# Patient Record
Sex: MALE | Race: WHITE | NOT HISPANIC OR LATINO | Employment: FULL TIME | URBAN - METROPOLITAN AREA
[De-identification: names, ages, dates, MRNs, and addresses within clinical notes are randomized per-mention and may not be internally consistent; named-entity substitution may affect disease eponyms.]

---

## 2018-06-01 ENCOUNTER — HOSPITAL ENCOUNTER (EMERGENCY)
Facility: CLINIC | Age: 43
Discharge: HOME | End: 2018-06-01
Attending: EMERGENCY MEDICINE | Admitting: EMERGENCY MEDICINE
Payer: COMMERCIAL

## 2018-06-01 VITALS
SYSTOLIC BLOOD PRESSURE: 108 MMHG | DIASTOLIC BLOOD PRESSURE: 77 MMHG | RESPIRATION RATE: 16 BRPM | HEIGHT: 72 IN | WEIGHT: 250 LBS | BODY MASS INDEX: 33.86 KG/M2 | TEMPERATURE: 98 F | OXYGEN SATURATION: 97 %

## 2018-06-01 DIAGNOSIS — G56.22 LESION OF LEFT ULNAR NERVE: ICD-10-CM

## 2018-06-01 DIAGNOSIS — R07.89 ATYPICAL CHEST PAIN: ICD-10-CM

## 2018-06-01 DIAGNOSIS — K21.9 GASTROESOPHAGEAL REFLUX DISEASE, ESOPHAGITIS PRESENCE NOT SPECIFIED: ICD-10-CM

## 2018-06-01 LAB
ANION GAP SERPL CALCULATED.3IONS-SCNC: 7 MMOL/L (ref 3–14)
BASOPHILS # BLD AUTO: 0 10E9/L (ref 0–0.2)
BASOPHILS NFR BLD AUTO: 0.3 %
BUN SERPL-MCNC: 12 MG/DL (ref 7–30)
CALCIUM SERPL-MCNC: 8.1 MG/DL (ref 8.5–10.1)
CHLORIDE SERPL-SCNC: 110 MMOL/L (ref 94–109)
CO2 SERPL-SCNC: 22 MMOL/L (ref 20–32)
CREAT SERPL-MCNC: 0.95 MG/DL (ref 0.66–1.25)
DIFFERENTIAL METHOD BLD: ABNORMAL
EOSINOPHIL # BLD AUTO: 0.1 10E9/L (ref 0–0.7)
EOSINOPHIL NFR BLD AUTO: 0.6 %
ERYTHROCYTE [DISTWIDTH] IN BLOOD BY AUTOMATED COUNT: 12.3 % (ref 10–15)
GFR SERPL CREATININE-BSD FRML MDRD: 88 ML/MIN/1.7M2
GLUCOSE SERPL-MCNC: 98 MG/DL (ref 70–99)
HCT VFR BLD AUTO: 46.1 % (ref 40–53)
HGB BLD-MCNC: 16.5 G/DL (ref 13.3–17.7)
IMM GRANULOCYTES # BLD: 0 10E9/L (ref 0–0.4)
IMM GRANULOCYTES NFR BLD: 0.1 %
INTERPRETATION ECG - MUSE: NORMAL
LYMPHOCYTES # BLD AUTO: 1.9 10E9/L (ref 0.8–5.3)
LYMPHOCYTES NFR BLD AUTO: 19 %
MCH RBC QN AUTO: 31.9 PG (ref 26.5–33)
MCHC RBC AUTO-ENTMCNC: 35.8 G/DL (ref 31.5–36.5)
MCV RBC AUTO: 89 FL (ref 78–100)
MONOCYTES # BLD AUTO: 0.3 10E9/L (ref 0–1.3)
MONOCYTES NFR BLD AUTO: 3.3 %
NEUTROPHILS # BLD AUTO: 7.5 10E9/L (ref 1.6–8.3)
NEUTROPHILS NFR BLD AUTO: 76.7 %
NRBC # BLD AUTO: 0 10*3/UL
NRBC BLD AUTO-RTO: 0 /100
PLATELET # BLD AUTO: 119 10E9/L (ref 150–450)
POTASSIUM SERPL-SCNC: 4.2 MMOL/L (ref 3.4–5.3)
RBC # BLD AUTO: 5.17 10E12/L (ref 4.4–5.9)
SODIUM SERPL-SCNC: 139 MMOL/L (ref 133–144)
TROPONIN I SERPL-MCNC: <0.015 UG/L (ref 0–0.04)
WBC # BLD AUTO: 9.8 10E9/L (ref 4–11)

## 2018-06-01 PROCEDURE — 25000125 ZZHC RX 250: Performed by: EMERGENCY MEDICINE

## 2018-06-01 PROCEDURE — 25000132 ZZH RX MED GY IP 250 OP 250 PS 637: Performed by: EMERGENCY MEDICINE

## 2018-06-01 PROCEDURE — 99284 EMERGENCY DEPT VISIT MOD MDM: CPT

## 2018-06-01 PROCEDURE — 84484 ASSAY OF TROPONIN QUANT: CPT | Performed by: EMERGENCY MEDICINE

## 2018-06-01 PROCEDURE — 85025 COMPLETE CBC W/AUTO DIFF WBC: CPT | Performed by: EMERGENCY MEDICINE

## 2018-06-01 PROCEDURE — 93005 ELECTROCARDIOGRAM TRACING: CPT

## 2018-06-01 PROCEDURE — 80048 BASIC METABOLIC PNL TOTAL CA: CPT | Performed by: EMERGENCY MEDICINE

## 2018-06-01 RX ORDER — OMEPRAZOLE 40 MG/1
40 CAPSULE, DELAYED RELEASE ORAL DAILY
Qty: 30 CAPSULE | Refills: 1 | Status: SHIPPED | OUTPATIENT
Start: 2018-06-01 | End: 2021-07-29

## 2018-06-01 RX ADMIN — LIDOCAINE HYDROCHLORIDE 30 ML: 20 SOLUTION ORAL; TOPICAL at 12:43
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
## 2018-06-01 NOTE — ED AVS SNAPSHOT
Emergency Department    [address] [facility] [address]    Phone:  [phone]    Fax:  [phone]                                       Cristofer Del Cid   MRN: [id number]    Department:   Emergency Department   Date of Visit:  6/1/2018           Patient Information     Date Of Birth          1975        Your diagnoses for this visit were:     Lesion of left ulnar nerve     Atypical chest pain     Gastroesophageal reflux disease, esophagitis presence not specified        You were seen by Nam Huitron MD.      Follow-up Information     Follow up with your primary MD.        Discharge Instructions         GERD (Adult)    The esophagus is a tube that carries food from the mouth to the stomach. A valve at the lower end of the esophagus prevents stomach acid from flowing upward. When this valve doesn't work properly, stomach contents may repeatedly flow back up (reflux) into the esophagus. This is called gastroesophageal reflux disease (GERD). GERD can irritate the esophagus. It can cause problems with swallowing or breathing. In severe cases, GERD can cause recurrent pneumonia or other serious problems.  Symptoms of reflux include burning, pressure or sharp pain in the upper abdomen or mid to lower chest. The pain can spread to the neck, back, or shoulder. There may be belching, an acid taste in the back of the throat, chronic cough, or sore throat or hoarseness. GERD symptoms often occur during the day after a big meal. They can also occur at night when lying down.   Home care  Lifestyle changes can help reduce symptoms. If needed, medicines may be prescribed. Symptoms often improve with treatment, but if treatment is stopped, the symptoms often return after a few months. So most persons with GERD will need to continue treatment.  Lifestyle changes    Limit or avoid fatty, fried, and spicy foods, as well as coffee, chocolate, mint, and foods with high acid content such as tomatoes and citrus  "fruit and juices (orange, grapefruit, lemon).    Don t eat large meals, especially at night. Frequent, smaller meals are best. Do not lie down right after eating. And don t eat anything 3 hours before going to bed.    Avoid drinking alcohol and smoking. As much as possible, stay away from second hand smoke.    If you are overweight, losing weight will reduce symptoms.     Avoid wearing tight clothing around your stomach area.    If your symptoms occur during sleep, use a foam wedge to elevate your upper body (not just your head.) Or, place 4\" blocks under the head of your bed.  Medicines  If needed, medicines can help relieve the symptoms of GERD and prevent damage to the esophagus. Discuss a medicine plan with your healthcare provider. This may include one or more of the following medicines:    Antacids to help neutralize the normal acids in your stomach.    Acid blockers (H2 blockers) to decrease acid production.    Acid inhibitors (PPIs) to decrease acid production in a different way than the blockers. They may work better, but can take a little longer to take effect.  Take an antacid 30-60 minutes after eating and at bedtime, but not at the same time as an acid blocker.  Try not to take medicines such as ibuprofen and aspirin. If you are taking aspirin for your heart or other medical reasons, talk to your healthcare provider about stopping it.  Follow-up care  Follow up with your healthcare provider or as advised by our staff.  When to seek medical advice  Call your healthcare provider if any of the following occur:    Stomach pain gets worse or moves to the lower right abdomen (appendix area)    Chest pain appears or gets worse, or spreads to the back, neck, shoulder, or arm    Frequent vomiting (can t keep down liquids)    Blood in the stool or vomit (red or black in color)    Feeling weak or dizzy    Fever of 100.4 F (38 C) or higher, or as directed by your healthcare provider  Date Last Reviewed: " 6/23/2015 2000-2017 [employer]. [address] [place] [address], [place] [address]. All rights reserved. This information is not intended as a substitute for professional medical care. Always follow your healthcare professional's instructions.      Discharge Instructions  Chest Pain    You have been seen today for chest pain or discomfort.  At this time, your doctor has found no signs that your chest pain is due to a serious or life-threatening condition, (or you have declined more testing and/or admission to the hospital). However, sometimes there is a serious problem that does not show up right away. Your evaluation today may not be complete and you may need further testing and evaluation.     You need to follow-up with your regular doctor within 3 days.    Return to the Emergency Department if:    Your chest pain changes, gets worse, starts to happen more often, or comes with less activity.    You are short of breath.    You get very weak or tired.    You pass out or faint.    You have any new symptoms, like fever, cough, numb legs, or you cough up blood.    You have anything else that worries you.    Until you follow-up with your regular doctor please do the following:    Take one aspirin daily unless you have an allergy or are told not to by your doctor.    If a stress test appointment has been made, go to the appointment.    If you have questions, contact your regular doctor.    If your doctor today has told you to follow-up with your regular doctor, it is very important that you make an appointment with your clinic and go to the appointment.  If you do not follow-up with your primary doctor, it may result in missing an important development which could result in permanent injury or disability and/or lasting pain.  If there is any problem keeping your appointment, call your doctor or return to the Emergency Department.    If you were given a prescription for medicine here today, be sure to read all of  the information (including the package insert) that comes with your prescription.  This will include important information about the medicine, its side effects, and any warnings that you need to know about.  The pharmacist who fills the prescription can provide more information and answer questions you may have about the medicine.  If you have questions or concerns that the pharmacist cannot address, please call or return to the Emergency Department.     Opioid Medication Information    Pain medications are among the most commonly prescribed medicines, so we are including this information for all our patients. If you did not receive pain medication or get a prescription for pain medicine, you can ignore it.     You may have been given a prescription for an opioid (narcotic) pain medicine and/or have received a pain medicine while here in the Emergency Department. These medicines can make you drowsy or impaired. You must not drive, operate dangerous equipment, or engage in any other dangerous activities while taking these medications. If you drive while taking these medications, you could be arrested for DUI, or driving under the influence. Do not drink any alcohol while you are taking these medications.     Opioid pain medications can cause addiction. If you have a history of chemical dependency of any type, you are at a higher risk of becoming addicted to pain medications.  Only take these prescribed medications to treat your pain when all other options have been tried. Take it for as short a time and as few doses as possible. Store your pain pills in a secure place, as they are frequently stolen and provide a dangerous opportunity for children or visitors in your house to start abusing these powerful medications. We will not replace any lost or stolen medicine.  As soon as your pain is better, you should flush all your remaining medication.     Many prescription pain medications contain Tylenol  (acetaminophen),  "including Vicodin , Tylenol #3 , Norco , Lortab , and Percocet .  You should not take any extra pills of Tylenol  if you are using these prescription medications or you can get very sick.  Do not ever take more than 3000 mg of acetaminophen in any 24 hour period.    All opioids tend to cause constipation. Drink plenty of water and eat foods that have a lot of fiber, such as fruits, vegetables, prune juice, apple juice and high fiber cereal.  Take a laxative if you don t move your bowels at least every other day. Miralax , Milk of Magnesia, Colace , or Senna  can be used to keep you regular.      Remember that you can always come back to the Emergency Department if you are not able to see your regular doctor in the amount of time listed above, if you get any new symptoms, or if there is anything that worries you.          Ulnar Nerve Palsy  The ulnar nerve travels down the arm from the shoulder to the hand. It controls movement and feeling in the wrist and hand. Damage to this nerve can cause a condition called ulnar nerve palsy.  A common cause of ulnar nerve palsy is leaning on the elbow for long periods of time. Injury to the arm or elbow, such as a fracture, can also damage the ulnar nerve.  Symptoms of ulnar nerve palsy include:    Numbness, tingling, or burning (often described as \"pins and needles\")    Pain    Weakness in the hand and fingers  The treatment depends on the cause of the nerve damage. In some cases, the problem will go away on its once pressure to the nerve is relieved. Splinting the wrist to limit movement may help with healing. If the problem is due to trauma or injury, physical therapy may be prescribed. Corticosteroids injections into the area may reduce swelling and pressure on the nerve. Surgery to repair the nerve may be needed for chronic symptoms that do not respond to simpler treatments.  Home care    Rest the wrist and arm until normal feeling and strength return.    If you were given " a splint or sling, wear it as directed.    Avoid positions leaning on your elbows.    If medicines were prescribed for pain or nerve sensations, take them as directed.  Follow-up care  Follow up with your healthcare provider or as advised by our staff.  When to seek medical advice  Call your healthcare provider right away if you have any of the following:    Increasing arm swelling or pain    Numbness or weakness of the arm    Symptoms spread to other parts of the body    Slurred speech, confusion    Trouble speaking, walking, or seeing  Date Last Reviewed: 9/25/2015 2000-2017 [employer]. [address] [place] [address]. All rights reserved. This information is not intended as a substitute for professional medical care. Always follow your healthcare professional's instructions.          24 Hour Appointment Hotline       To make an appointment at any [facility], call [phone] ([phone]). If you don't have a family doctor or clinic, we will help you find one. [facility] clinics are conveniently located to serve the needs of you and your family.          ED Discharge Orders     Exercise Stress Echocardiogram       Administration of IV contrast will be tailored to this examination per the appropriate written protocol listed in the Echocardiography department Protocol Book, or by the supervising Cardiologist. This may result in an order change.    Use of contrast is at the discretion of the supervising Cardiologist.                     Review of your medicines      START taking        Dose / Directions Last dose taken    omeprazole 40 MG capsule   Commonly known as:  priLOSEC   Dose:  40 mg   Quantity:  30 capsule        Take 1 capsule (40 mg) by mouth daily Take 30-60 minutes before a meal.   Refills:  1                Prescriptions were sent or printed at these locations (1 Prescription)                   Other Prescriptions                Printed at Department/Unit printer (1  of 1)         omeprazole (PRILOSEC) 40 MG capsule                Procedures and tests performed during your visit     Basic metabolic panel    CBC with platelets + differential    EKG 12 lead    Peripheral IV catheter    Troponin I (now)      Orders Needing Specimen Collection     None      Pending Results     No orders found from 5/30/2018 to 6/2/2018.            Pending Culture Results     No orders found from 5/30/2018 to 6/2/2018.            Pending Results Instructions     If you had any lab results that were not finalized at the time of your Discharge, you can call the ED Lab Result RN at [phone]. You will be contacted by this team for any positive Lab results or changes in treatment. The nurses are available 7 days a week from 10A to 6:30P.  You can leave a message 24 hours per day and they will return your call.        Test Results From Your Hospital Stay        6/1/2018 10:58 AM      Component Results     Component Value Ref Range & Units Status    WBC 9.8 4.0 - 11.0 10e9/L Final    RBC Count 5.17 4.4 - 5.9 10e12/L Final    Hemoglobin 16.5 13.3 - 17.7 g/dL Final    Hematocrit 46.1 40.0 - 53.0 % Final    MCV 89 78 - 100 fl Final    MCH 31.9 26.5 - 33.0 pg Final    MCHC 35.8 31.5 - 36.5 g/dL Final    RDW 12.3 10.0 - 15.0 % Final    Platelet Count 119 (L) 150 - 450 10e9/L Final    Diff Method Automated Method  Final    % Neutrophils 76.7 % Final    % Lymphocytes 19.0 % Final    % Monocytes 3.3 % Final    % Eosinophils 0.6 % Final    % Basophils 0.3 % Final    % Immature Granulocytes 0.1 % Final    Nucleated RBCs 0 0 /100 Final    Absolute Neutrophil 7.5 1.6 - 8.3 10e9/L Final    Absolute Lymphocytes 1.9 0.8 - 5.3 10e9/L Final    Absolute Monocytes 0.3 0.0 - 1.3 10e9/L Final    Absolute Eosinophils 0.1 0.0 - 0.7 10e9/L Final    Absolute Basophils 0.0 0.0 - 0.2 10e9/L Final    Abs Immature Granulocytes 0.0 0 - 0.4 10e9/L Final    Absolute Nucleated RBC 0.0  Final         6/1/2018 11:17 AM      Component  Results     Component Value Ref Range & Units Status    Sodium 139 133 - 144 mmol/L Final    Potassium 4.2 3.4 - 5.3 mmol/L Final    Chloride 110 (H) 94 - 109 mmol/L Final    Carbon Dioxide 22 20 - 32 mmol/L Final    Anion Gap 7 3 - 14 mmol/L Final    Glucose 98 70 - 99 mg/dL Final    Urea Nitrogen 12 7 - 30 mg/dL Final    Creatinine 0.95 0.66 - 1.25 mg/dL Final    GFR Estimate 88 >60 mL/min/1.7m2 Final    Non  GFR Calc    GFR Estimate If Black >90 >60 mL/min/1.7m2 Final    African American GFR Calc    Calcium 8.1 (L) 8.5 - 10.1 mg/dL Final         6/1/2018 11:20 AM      Component Results     Component Value Ref Range & Units Status    Troponin I ES <0.015 0.000 - 0.045 ug/L Final    The 99th percentile for upper reference range is 0.045 ug/L.  Troponin values   in the range of 0.045 - 0.120 ug/L may be associated with risks of adverse   clinical events.                  Clinical Quality Measure: Blood Pressure Screening     Your blood pressure was checked while you were in the emergency department today. The last reading we obtained was  BP: 108/77 . Please read the guidelines below about what these numbers mean and what you should do about them.  If your systolic blood pressure (the top number) is less than 120 and your diastolic blood pressure (the bottom number) is less than 80, then your blood pressure is normal. There is nothing more that you need to do about it.  If your systolic blood pressure (the top number) is 120-139 or your diastolic blood pressure (the bottom number) is 80-89, your blood pressure may be higher than it should be. You should have your blood pressure rechecked within a year by a primary care provider.  If your systolic blood pressure (the top number) is 140 or greater or your diastolic blood pressure (the bottom number) is 90 or greater, you may have high blood pressure. High blood pressure is treatable, but if left untreated over time it can put you at risk for heart  "attack, stroke, or kidney failure. You should have your blood pressure rechecked by a primary care provider within the next 4 weeks.  If your provider in the emergency department today gave you specific instructions to follow-up with your doctor or provider even sooner than that, you should follow that instruction and not wait for up to 4 weeks for your follow-up visit.        Thank you for choosing [facility]       Thank you for choosing [facility] for your care. Our goal is always to provide you with excellent care. Hearing back from our patients is one way we can continue to improve our services. Please take a few minutes to complete the written survey that you may receive in the mail after you visit with us. Thank you!        [employer] Information     [employer] lets you send messages to your doctor, view your test results, renew your prescriptions, schedule appointments and more. To sign up, go to [email or web address] . Click on \"Log in\" on the left side of the screen, which will take you to the Welcome page. Then click on \"Sign up Now\" on the right side of the page.     You will be asked to enter the access code listed below, as well as some personal information. Please follow the directions to create your username and password.     Your access code is: [email or web address]  Expires: 2018  1:13 PM     Your access code will  in 90 days. If you need help or a new code, please call your [facility] clinic or [phone].        Care EveryWhere ID     This is your Care EveryWhere ID. This could be used by other organizations to access your [facility] medical records  [id number]        Equal Access to Services     MERY DALEY : Hadmonica Atwood, waaxda luqadaha, qaybta kaalmada brad, geneva ramsey. So [facility] [phone].    ATENCIÓN: Si habla español, tiene a nathan disposición servicios gratuitos de asistencia lingüística. Llame al [phone].    We comply with applicable " federal civil rights laws and [place] laws. We do not discriminate on the basis of race, color, national origin, age, disability, sex, sexual orientation, or gender identity.            After Visit Summary       This is your record. Keep this with you and show to your community pharmacist(s) and doctor(s) at your next visit.

## 2018-06-01 NOTE — ED AVS SNAPSHOT
Emergency Department    [address] [place] [address]    Phone:  [phone]    Fax:  [phone]                                       Cristofer Del Cid   MRN: [id number]    Department:   Emergency Department   Date of Visit:  6/1/2018           After Visit Summary Signature Page     I have received my discharge instructions, and my questions have been answered. I have discussed any challenges I see with this plan with the nurse or doctor.    ..........................................................................................................................................  Patient/Patient Representative Signature      ..........................................................................................................................................  Patient Representative Print Name and Relationship to Patient    ..................................................               ................................................  Date                                            Time    ..........................................................................................................................................  Reviewed by Signature/Title    ...................................................              ..............................................  Date                                                            Time

## 2018-06-01 NOTE — DISCHARGE INSTRUCTIONS
"  GERD (Adult)    The esophagus is a tube that carries food from the mouth to the stomach. A valve at the lower end of the esophagus prevents stomach acid from flowing upward. When this valve doesn't work properly, stomach contents may repeatedly flow back up (reflux) into the esophagus. This is called gastroesophageal reflux disease (GERD). GERD can irritate the esophagus. It can cause problems with swallowing or breathing. In severe cases, GERD can cause recurrent pneumonia or other serious problems.  Symptoms of reflux include burning, pressure or sharp pain in the upper abdomen or mid to lower chest. The pain can spread to the neck, back, or shoulder. There may be belching, an acid taste in the back of the throat, chronic cough, or sore throat or hoarseness. GERD symptoms often occur during the day after a big meal. They can also occur at night when lying down.   Home care  Lifestyle changes can help reduce symptoms. If needed, medicines may be prescribed. Symptoms often improve with treatment, but if treatment is stopped, the symptoms often return after a few months. So most persons with GERD will need to continue treatment.  Lifestyle changes    Limit or avoid fatty, fried, and spicy foods, as well as coffee, chocolate, mint, and foods with high acid content such as tomatoes and citrus fruit and juices (orange, grapefruit, lemon).    Don t eat large meals, especially at night. Frequent, smaller meals are best. Do not lie down right after eating. And don t eat anything 3 hours before going to bed.    Avoid drinking alcohol and smoking. As much as possible, stay away from second hand smoke.    If you are overweight, losing weight will reduce symptoms.     Avoid wearing tight clothing around your stomach area.    If your symptoms occur during sleep, use a foam wedge to elevate your upper body (not just your head.) Or, place 4\" blocks under the head of your bed.  Medicines  If needed, medicines can help relieve " the symptoms of GERD and prevent damage to the esophagus. Discuss a medicine plan with your healthcare provider. This may include one or more of the following medicines:    Antacids to help neutralize the normal acids in your stomach.    Acid blockers (H2 blockers) to decrease acid production.    Acid inhibitors (PPIs) to decrease acid production in a different way than the blockers. They may work better, but can take a little longer to take effect.  Take an antacid 30-60 minutes after eating and at bedtime, but not at the same time as an acid blocker.  Try not to take medicines such as ibuprofen and aspirin. If you are taking aspirin for your heart or other medical reasons, talk to your healthcare provider about stopping it.  Follow-up care  Follow up with your healthcare provider or as advised by our staff.  When to seek medical advice  Call your healthcare provider if any of the following occur:    Stomach pain gets worse or moves to the lower right abdomen (appendix area)    Chest pain appears or gets worse, or spreads to the back, neck, shoulder, or arm    Frequent vomiting (can t keep down liquids)    Blood in the stool or vomit (red or black in color)    Feeling weak or dizzy    Fever of 100.4 F (38 C) or higher, or as directed by your healthcare provider  Date Last Reviewed: 6/23/2015 2000-2017 [employer]. [address] [place] [address], [place] [address]. All rights reserved. This information is not intended as a substitute for professional medical care. Always follow your healthcare professional's instructions.      Discharge Instructions  Chest Pain    You have been seen today for chest pain or discomfort.  At this time, your doctor has found no signs that your chest pain is due to a serious or life-threatening condition, (or you have declined more testing and/or admission to the hospital). However, sometimes there is a serious problem that does not show up right away. Your evaluation today may  not be complete and you may need further testing and evaluation.     You need to follow-up with your regular doctor within 3 days.    Return to the Emergency Department if:    Your chest pain changes, gets worse, starts to happen more often, or comes with less activity.    You are short of breath.    You get very weak or tired.    You pass out or faint.    You have any new symptoms, like fever, cough, numb legs, or you cough up blood.    You have anything else that worries you.    Until you follow-up with your regular doctor please do the following:    Take one aspirin daily unless you have an allergy or are told not to by your doctor.    If a stress test appointment has been made, go to the appointment.    If you have questions, contact your regular doctor.    If your doctor today has told you to follow-up with your regular doctor, it is very important that you make an appointment with your clinic and go to the appointment.  If you do not follow-up with your primary doctor, it may result in missing an important development which could result in permanent injury or disability and/or lasting pain.  If there is any problem keeping your appointment, call your doctor or return to the Emergency Department.    If you were given a prescription for medicine here today, be sure to read all of the information (including the package insert) that comes with your prescription.  This will include important information about the medicine, its side effects, and any warnings that you need to know about.  The pharmacist who fills the prescription can provide more information and answer questions you may have about the medicine.  If you have questions or concerns that the pharmacist cannot address, please call or return to the Emergency Department.     Opioid Medication Information    Pain medications are among the most commonly prescribed medicines, so we are including this information for all our patients. If you did not receive  pain medication or get a prescription for pain medicine, you can ignore it.     You may have been given a prescription for an opioid (narcotic) pain medicine and/or have received a pain medicine while here in the Emergency Department. These medicines can make you drowsy or impaired. You must not drive, operate dangerous equipment, or engage in any other dangerous activities while taking these medications. If you drive while taking these medications, you could be arrested for DUI, or driving under the influence. Do not drink any alcohol while you are taking these medications.     Opioid pain medications can cause addiction. If you have a history of chemical dependency of any type, you are at a higher risk of becoming addicted to pain medications.  Only take these prescribed medications to treat your pain when all other options have been tried. Take it for as short a time and as few doses as possible. Store your pain pills in a secure place, as they are frequently stolen and provide a dangerous opportunity for children or visitors in your house to start abusing these powerful medications. We will not replace any lost or stolen medicine.  As soon as your pain is better, you should flush all your remaining medication.     Many prescription pain medications contain Tylenol  (acetaminophen), including Vicodin , Tylenol #3 , Norco , Lortab , and Percocet .  You should not take any extra pills of Tylenol  if you are using these prescription medications or you can get very sick.  Do not ever take more than 3000 mg of acetaminophen in any 24 hour period.    All opioids tend to cause constipation. Drink plenty of water and eat foods that have a lot of fiber, such as fruits, vegetables, prune juice, apple juice and high fiber cereal.  Take a laxative if you don t move your bowels at least every other day. Miralax , Milk of Magnesia, Colace , or Senna  can be used to keep you regular.      Remember that you can always come back  "to the Emergency Department if you are not able to see your regular doctor in the amount of time listed above, if you get any new symptoms, or if there is anything that worries you.          Ulnar Nerve Palsy  The ulnar nerve travels down the arm from the shoulder to the hand. It controls movement and feeling in the wrist and hand. Damage to this nerve can cause a condition called ulnar nerve palsy.  A common cause of ulnar nerve palsy is leaning on the elbow for long periods of time. Injury to the arm or elbow, such as a fracture, can also damage the ulnar nerve.  Symptoms of ulnar nerve palsy include:    Numbness, tingling, or burning (often described as \"pins and needles\")    Pain    Weakness in the hand and fingers  The treatment depends on the cause of the nerve damage. In some cases, the problem will go away on its once pressure to the nerve is relieved. Splinting the wrist to limit movement may help with healing. If the problem is due to trauma or injury, physical therapy may be prescribed. Corticosteroids injections into the area may reduce swelling and pressure on the nerve. Surgery to repair the nerve may be needed for chronic symptoms that do not respond to simpler treatments.  Home care    Rest the wrist and arm until normal feeling and strength return.    If you were given a splint or sling, wear it as directed.    Avoid positions leaning on your elbows.    If medicines were prescribed for pain or nerve sensations, take them as directed.  Follow-up care  Follow up with your healthcare provider or as advised by our staff.  When to seek medical advice  Call your healthcare provider right away if you have any of the following:    Increasing arm swelling or pain    Numbness or weakness of the arm    Symptoms spread to other parts of the body    Slurred speech, confusion    Trouble speaking, walking, or seeing  Date Last Reviewed: 9/25/2015 2000-2017 [employer]. [address] [facility], " [place] [address]. All rights reserved. This information is not intended as a substitute for professional medical care. Always follow your healthcare professional's instructions.

## 2018-06-03 ASSESSMENT — ENCOUNTER SYMPTOMS
PSYCHIATRIC NEGATIVE: 1
UNEXPECTED WEIGHT CHANGE: 0
TREMORS: 0
FEVER: 0
CHEST TIGHTNESS: 1
NUMBNESS: 1
SPEECH DIFFICULTY: 0
HEADACHES: 0
DIZZINESS: 0
FACIAL ASYMMETRY: 0
GASTROINTESTINAL NEGATIVE: 1
EYES NEGATIVE: 1
LIGHT-HEADEDNESS: 0
WEAKNESS: 0
CHILLS: 0

## 2019-08-01 ENCOUNTER — RECORDS - HEALTHEAST (OUTPATIENT)
Dept: GENERAL RADIOLOGY | Facility: CLINIC | Age: 44
End: 2019-08-01

## 2019-08-01 ENCOUNTER — OFFICE VISIT - HEALTHEAST (OUTPATIENT)
Dept: FAMILY MEDICINE | Facility: CLINIC | Age: 44
End: 2019-08-01

## 2019-08-01 DIAGNOSIS — M25.551 PAIN IN RIGHT HIP: ICD-10-CM

## 2019-08-01 DIAGNOSIS — G89.29 OTHER CHRONIC PAIN: ICD-10-CM

## 2019-08-01 DIAGNOSIS — M25.551 CHRONIC HIP PAIN, RIGHT: ICD-10-CM

## 2019-08-01 DIAGNOSIS — G89.29 CHRONIC HIP PAIN, RIGHT: ICD-10-CM

## 2019-08-01 DIAGNOSIS — Z00.00 ENCOUNTER FOR MEDICAL EXAMINATION TO ESTABLISH CARE: ICD-10-CM

## 2019-08-01 ASSESSMENT — MIFFLIN-ST. JEOR: SCORE: 2023.44

## 2019-08-02 ENCOUNTER — COMMUNICATION - HEALTHEAST (OUTPATIENT)
Dept: FAMILY MEDICINE | Facility: CLINIC | Age: 44
End: 2019-08-02

## 2019-08-12 ENCOUNTER — COMMUNICATION - HEALTHEAST (OUTPATIENT)
Dept: SCHEDULING | Facility: CLINIC | Age: 44
End: 2019-08-12

## 2019-08-12 DIAGNOSIS — G89.29 CHRONIC HIP PAIN, RIGHT: ICD-10-CM

## 2019-08-12 DIAGNOSIS — M25.551 CHRONIC HIP PAIN, RIGHT: ICD-10-CM

## 2019-08-21 ENCOUNTER — COMMUNICATION - HEALTHEAST (OUTPATIENT)
Dept: FAMILY MEDICINE | Facility: CLINIC | Age: 44
End: 2019-08-21

## 2019-08-27 ENCOUNTER — RECORDS - HEALTHEAST (OUTPATIENT)
Dept: ADMINISTRATIVE | Facility: OTHER | Age: 44
End: 2019-08-27

## 2019-08-28 ENCOUNTER — COMMUNICATION - HEALTHEAST (OUTPATIENT)
Dept: SCHEDULING | Facility: CLINIC | Age: 44
End: 2019-08-28

## 2019-08-28 DIAGNOSIS — G89.29 CHRONIC HIP PAIN, RIGHT: ICD-10-CM

## 2019-08-28 DIAGNOSIS — M25.551 CHRONIC HIP PAIN, RIGHT: ICD-10-CM

## 2019-10-30 ENCOUNTER — OFFICE VISIT - HEALTHEAST (OUTPATIENT)
Dept: FAMILY MEDICINE | Facility: CLINIC | Age: 44
End: 2019-10-30

## 2019-10-30 DIAGNOSIS — Z02.89 ENCOUNTER FOR COMPLETION OF FORM WITH PATIENT: ICD-10-CM

## 2019-10-30 DIAGNOSIS — M25.551 CHRONIC PAIN OF RIGHT HIP: ICD-10-CM

## 2019-10-30 DIAGNOSIS — G89.29 CHRONIC PAIN OF RIGHT HIP: ICD-10-CM

## 2019-10-30 ASSESSMENT — MIFFLIN-ST. JEOR: SCORE: 2053.21

## 2020-01-30 ENCOUNTER — OFFICE VISIT - HEALTHEAST (OUTPATIENT)
Dept: FAMILY MEDICINE | Facility: CLINIC | Age: 45
End: 2020-01-30

## 2020-01-30 DIAGNOSIS — J01.00 ACUTE MAXILLARY SINUSITIS, RECURRENCE NOT SPECIFIED: ICD-10-CM

## 2020-03-10 ENCOUNTER — OFFICE VISIT - HEALTHEAST (OUTPATIENT)
Dept: FAMILY MEDICINE | Facility: CLINIC | Age: 45
End: 2020-03-10

## 2020-03-10 DIAGNOSIS — N52.9 ERECTILE DYSFUNCTION, UNSPECIFIED ERECTILE DYSFUNCTION TYPE: ICD-10-CM

## 2020-03-10 DIAGNOSIS — M79.671 PAIN IN BOTH FEET: ICD-10-CM

## 2020-03-10 DIAGNOSIS — M79.672 PAIN IN BOTH FEET: ICD-10-CM

## 2020-03-10 ASSESSMENT — MIFFLIN-ST. JEOR: SCORE: 2087.51

## 2020-04-28 ENCOUNTER — OFFICE VISIT - HEALTHEAST (OUTPATIENT)
Dept: FAMILY MEDICINE | Facility: CLINIC | Age: 45
End: 2020-04-28

## 2020-04-28 DIAGNOSIS — N52.9 ERECTILE DYSFUNCTION, UNSPECIFIED ERECTILE DYSFUNCTION TYPE: ICD-10-CM

## 2020-04-28 DIAGNOSIS — K21.9 GASTROESOPHAGEAL REFLUX DISEASE WITHOUT ESOPHAGITIS: ICD-10-CM

## 2020-04-28 NOTE — ASSESSMENT & PLAN NOTE
The pathophysiology of reflux is discussed.  Anti-reflux measures such as raising the head of the bed, avoiding tight clothing or belts, avoiding eating late at night and not lying down shortly after mealtime and achieving weight loss are discussed. Avoid ASA, NSAID's, caffeine, peppermints, alcohol and tobacco. OTC H2 blockers and/or antacids are often very helpful for PRN use. However, for persisting chronic or daily symptoms, prescription strength H2 blockers or a trial of PPI's are often used. Further recommendations to him: Rx for PPI is written. He should alert me if there are persistent symptoms, dysphagia, weight loss or GI bleeding. FUV is scheduled.

## 2020-04-28 NOTE — ASSESSMENT & PLAN NOTE
Patient asks if he can try Cialis instead of Viagra for erectile dysfunction.  I do see Viagra listed in meds, the change has francheska made.

## 2020-05-22 ENCOUNTER — COMMUNICATION - HEALTHEAST (OUTPATIENT)
Dept: FAMILY MEDICINE | Facility: CLINIC | Age: 45
End: 2020-05-22

## 2020-05-22 DIAGNOSIS — K21.9 GASTROESOPHAGEAL REFLUX DISEASE WITHOUT ESOPHAGITIS: ICD-10-CM

## 2020-06-11 ENCOUNTER — OFFICE VISIT - HEALTHEAST (OUTPATIENT)
Dept: FAMILY MEDICINE | Facility: CLINIC | Age: 45
End: 2020-06-11

## 2020-06-11 DIAGNOSIS — H10.32 ACUTE CONJUNCTIVITIS OF LEFT EYE, UNSPECIFIED ACUTE CONJUNCTIVITIS TYPE: ICD-10-CM

## 2020-06-11 ASSESSMENT — MIFFLIN-ST. JEOR: SCORE: 2046.12

## 2020-07-06 ENCOUNTER — AMBULATORY - HEALTHEAST (OUTPATIENT)
Dept: FAMILY MEDICINE | Facility: CLINIC | Age: 45
End: 2020-07-06

## 2020-07-06 DIAGNOSIS — Z20.822 SUSPECTED COVID-19 VIRUS INFECTION: ICD-10-CM

## 2020-07-06 NOTE — PROGRESS NOTES
"Date: 2020 10:41:27  Clinician: Roberto Sims  Clinician NPI: [id number]  Patient: keesha ochoa  Patient : 1975  Patient Address: [address], [place] [address]  Patient Phone: [phone]  Visit Protocol: URI  Patient Summary:  keesha is a 45 year old ( : 1975 ) male who initiated a Visit for COVID-19 (Coronavirus) evaluation and screening. When asked the question \"Please sign me up to receive news, health information and promotions. \", keesha responded \"No\".    keesha states his symptoms started 1-2 days ago.   His symptoms consist of wheezing, nausea, malaise, and a sore throat. He is experiencing mild difficulty breathing with activities but can speak normally in full sentences.   Symptom details     Sore throat: keesha reports having mild throat pain (1-3 on a 10 point pain scale), does not have exudate on his tonsils, and can swallow liquids. He is not sure if the lymph nodes in his neck are enlarged. A rash has not appeared on the skin since the sore throat started.     Wheezing: keesha has not ever been diagnosed with asthma. Additional wheezing details as reported by the patient (free text): I was hard to breath in the heat        keesha denies having teeth pain, ageusia, diarrhea, vomiting, rhinitis, ear pain, headache, chills, myalgias, anosmia, facial pain or pressure, fever, cough, and nasal congestion. He also denies having recent facial or sinus surgery in the past 60 days and taking antibiotic medication in the past month.   Precipitating events  Within the past week, keesha has not been exposed to someone with strep throat.   Pertinent COVID-19 (Coronavirus) information  In the past 14 days, keesha has not worked in a congregate living setting.   He does not work or volunteer as healthcare worker or a  and does not work or volunteer in a healthcare facility.   keesha also has not lived in a congregate living setting in the past 14 days. He does not live " with a healthcare worker.   keesha has not had a close contact with a laboratory-confirmed COVID-19 patient within 14 days of symptom onset.   Pertinent medical history  keesha does not need a return to work/school note.   Weight: 240 lbs   keesha does not smoke or use smokeless tobacco.   Weight: 240 lbs    MEDICATIONS: No current medications, ALLERGIES: NKDA  Clinician Response:  Dear keesha,   Your symptoms show that you may have coronavirus (COVID-19). This illness can cause fever, cough and trouble breathing. Many people get a mild case and get better on their own. Some people can get very sick.  What should I do?  We would like to test you for this virus.   1. Please call [phone] to schedule your visit. Explain that you were referred by [facility] to have a COVID-19 test. Be ready to share your [facility] visit ID number.  The following will serve as your written order for this COVID Test, ordered by me, for the indication of suspected COVID [Z20.828]: The test will be ordered in [employer], our electronic health record, after you are scheduled. It will show as ordered and authorized by Chan Glover MD.  Order: COVID-19 (Coronavirus) PCR for SYMPTOMATIC testing from [facility].      2. When it's time for your COVID test:  Stay at least 6 feet away from others. (If someone will drive you to your test, stay in the backseat, as far away from the  as you can.)   Cover your mouth and nose with a mask, tissue or washcloth.  Go straight to the testing site. Don't make any stops on the way there or back.      3.Starting now: Stay home and away from others (self-isolate) until:   You've had no fever---and no medicine that reduces fever---for 3 full days (72 hours). And...   Your other symptoms have gotten better. For example, your cough or breathing has improved. And...   At least 10 days have passed since your symptoms started.       During this time, don't leave the house except for testing or medical care.   Stay in your own  "room, even for meals. Use your own bathroom if you can.   Stay away from others in your home. No hugging, kissing or shaking hands. No visitors.  Don't go to work, school or anywhere else.    Clean \"high touch\" surfaces often (doorknobs, counters, handles, etc.). Use a household cleaning spray or wipes. You'll find a full list of  on the EPA website: www.epa.gov/pesticide-registration/list-n-disinfectants-use-against-sars-cov-2.   Cover your mouth and nose with a mask, tissue or washcloth to avoid spreading germs.  Wash your hands and face often. Use soap and water.  Caregivers in these groups are at risk for severe illness due to COVID-19:  o People 65 years and older  o People who live in a nursing home or long-term care facility  o People with chronic disease (lung, heart, cancer, diabetes, kidney, liver, immunologic)  o People who have a weakened immune system, including those who:   Are in cancer treatment  Take medicine that weakens the immune system, such as corticosteroids  Had a bone marrow or organ transplant  Have an immune deficiency  Have poorly controlled HIV or AIDS  Are obese (body mass index of 40 or higher)  Smoke regularly   o Caregivers should wear gloves while washing dishes, handling laundry and cleaning bedrooms and bathrooms.  o Use caution when washing and drying laundry: Don't shake dirty laundry, and use the warmest water setting that you can.  o For more tips, go to www.cdc.gov/coronavirus/2019-ncov/downloads/10Things.pdf.    4.Sign up for [employer]. We know it's scary to hear that you might have COVID-19. We want to track your symptoms to make sure you're okay over the next 2 weeks. Please look for an email from [employer]---this is a free, online program that we'll use to keep in touch. To sign up, follow the link in the email. Learn more at [email or web address]  How can I take care of myself?   Get lots of rest. Drink extra fluids (unless a doctor has told you " not to).   Take Tylenol (acetaminophen) for fever or pain. If you have liver or kidney problems, ask your family doctor if it's okay to take Tylenol.   Adults can take either:    650 mg (two 325 mg pills) every 4 to 6 hours, or...   1,000 mg (two 500 mg pills) every 8 hours as needed.    Note: Don't take more than 3,000 mg in one day. Acetaminophen is found in many medicines (both prescribed and over-the-counter medicines). Read all labels to be sure you don't take too much.   For children, check the Tylenol bottle for the right dose. The dose is based on the child's age or weight.    If you have other health problems (like cancer, heart failure, an organ transplant or severe kidney disease): Call your specialty clinic if you don't feel better in the next 2 days.       Know when to call 911. Emergency warning signs include:    Trouble breathing or shortness of breath Pain or pressure in the chest that doesn't go away Feeling confused like you haven't felt before, or not being able to wake up Bluish-colored lips or face.  Where can I get more information?   [facility] -- About COVID-19: [email or web address]   CDC -- What to Do If You're Sick: www.cdc.gov/coronavirus/2019-ncov/about/steps-when-sick.html   CDC -- Ending Home Isolation: www.cdc.gov/coronavirus/2019-ncov/hcp/disposition-in-home-patients.html   CDC -- Caring for Someone: www.cdc.gov/coronavirus/2019-ncov/if-you-are-sick/care-for-someone.html   [facility] -- Interim Guidance for Hospital Discharge to Home: www.health.[facility].[place]/diseases/coronavirus/hcp/hospdischarge.pdf   [facility] clinical trials (COVID-19 research studies): clinicalaffairs.[facility]/umn-clinical-trials    Below are the COVID-19 hotlines at the [place] Department of Health ([facility]). Interpreters are available.    For health questions: Call 845-794-7809 or 1-109.858.3574 (7 a.m. to 7 p.m.) For questions about schools and childcare: Call 942-090-4885 or  1-456-970-0610 (7 a.m. to 7 p.m.)       [place] Strep Test    I am sorry you are not feeling well. Based on the information provided, it is possible you could have strep throat. When left untreated, strep can spread to other areas of the body and cause a more serious infection.  A strep test is the only way to determine if a bacterial infection or a virus is causing your sore throat. This is done in a lab where a swab of the back of your throat is collected and tested for the bacteria that causes strep.  Since you chose not to use the lab order, please be seen:     In a clinic or urgent care    Within 24 hours     Call 911 or go to the emergency room if you suddenly develop a rash, are drooling or unable to swallow fluids, if you are having difficulty breathing, or feel that your throat is closing off.   Diagnosis: Pain in throat  Diagnosis ICD: R07.0  [facility] Results: [place] Strep Test - Declined  [facility] Secondary Results: null

## 2020-07-07 ENCOUNTER — AMBULATORY - HEALTHEAST (OUTPATIENT)
Dept: FAMILY MEDICINE | Facility: CLINIC | Age: 45
End: 2020-07-07

## 2020-07-07 DIAGNOSIS — Z20.822 SUSPECTED COVID-19 VIRUS INFECTION: ICD-10-CM

## 2020-07-08 ENCOUNTER — COMMUNICATION - HEALTHEAST (OUTPATIENT)
Dept: FAMILY MEDICINE | Facility: CLINIC | Age: 45
End: 2020-07-08

## 2020-07-09 ENCOUNTER — NURSE TRIAGE (OUTPATIENT)
Dept: NURSING | Facility: CLINIC | Age: 45
End: 2020-07-09

## 2020-07-09 NOTE — TELEPHONE ENCOUNTER
Caller called regarding break out of his genital herpes.  States that he was not feeling okay from last Thursday (7/2)-yesterday (Wednesday-7/8).  Caller will like to talk to a provider, prefers connecting with his primary via my chart.  Virtual visit was recommended since pt wants to be seen by a provider  Yulissa Sommer RN  COVID 19 Nurse Triage Plan/Patient Instructions    Please be aware that novel coronavirus (COVID-19) may be circulating in the community. If you develop symptoms such as fever, cough, or SOB or if you have concerns about the presence of another infection including coronavirus (COVID-19), please contact your health care provider or visit [email or web address].     Disposition/Instructions    Virtual Visit with provider recommended. Reference Visit Selection Guide.    Thank you for taking steps to prevent the spread of this virus.  o Limit your contact with others.  o Wear a simple mask to cover your cough.  o Wash your hands well and often.    Resources    [facility]: About COVID-19: [email or web address]    CDC: What to Do If You're Sick: www.cdc.gov/coronavirus/2019-ncov/about/steps-when-sick.html    CDC: Ending Home Isolation: www.cdc.gov/coronavirus/2019-ncov/hcp/disposition-in-home-patients.html     CDC: Caring for Someone: www.cdc.gov/coronavirus/2019-ncov/if-you-are-sick/care-for-someone.html     [facility]: Interim Guidance for Hospital Discharge to Home: www.health.[facility].[place]/diseases/coronavirus/hcp/hospdischarge.pdf    [facility] clinical trials (COVID-19 research studies): clinicalaffairs.[facility]-clinical-trials     Below are the COVID-19 hotlines at the [facility] ([facility]). Interpreters are available.   o For health questions: Call 370-083-7319 or 1-546.327.3905 (7 a.m. to 7 p.m.)  o For questions about schools and childcare: Call 432-825-2696 or 1-584.190.1821 (7 a.m. to 7 p.m.)                     Additional Information    Herpes Simplex  (Genital), questions about    Negative: Symptoms of a Sexually Transmitted Disease or Infection (STD, STI)    Negative: HIV exposure, questions about    Negative: Needlestick, questions about    Protocols used: STD [id number]

## 2020-07-10 ENCOUNTER — OFFICE VISIT - HEALTHEAST (OUTPATIENT)
Dept: FAMILY MEDICINE | Facility: CLINIC | Age: 45
End: 2020-07-10

## 2020-07-10 DIAGNOSIS — R06.02 SHORTNESS OF BREATH: ICD-10-CM

## 2020-07-10 DIAGNOSIS — R05.9 COUGH: ICD-10-CM

## 2020-07-10 DIAGNOSIS — R11.0 NAUSEA: ICD-10-CM

## 2020-07-12 ENCOUNTER — COMMUNICATION - HEALTHEAST (OUTPATIENT)
Dept: FAMILY MEDICINE | Facility: CLINIC | Age: 45
End: 2020-07-12

## 2020-07-12 ENCOUNTER — AMBULATORY - HEALTHEAST (OUTPATIENT)
Dept: FAMILY MEDICINE | Facility: CLINIC | Age: 45
End: 2020-07-12

## 2020-07-12 DIAGNOSIS — R05.9 COUGH: ICD-10-CM

## 2020-07-12 DIAGNOSIS — R06.02 SHORTNESS OF BREATH: ICD-10-CM

## 2020-07-16 ENCOUNTER — COMMUNICATION - HEALTHEAST (OUTPATIENT)
Dept: FAMILY MEDICINE | Facility: CLINIC | Age: 45
End: 2020-07-16

## 2020-08-17 ENCOUNTER — COMMUNICATION - HEALTHEAST (OUTPATIENT)
Dept: SCHEDULING | Facility: CLINIC | Age: 45
End: 2020-08-17

## 2020-09-30 ENCOUNTER — COMMUNICATION - HEALTHEAST (OUTPATIENT)
Dept: FAMILY MEDICINE | Facility: CLINIC | Age: 45
End: 2020-09-30

## 2020-09-30 ENCOUNTER — OFFICE VISIT - HEALTHEAST (OUTPATIENT)
Dept: FAMILY MEDICINE | Facility: CLINIC | Age: 45
End: 2020-09-30

## 2020-09-30 DIAGNOSIS — Z82.5 FAMILY HISTORY OF ASTHMA: ICD-10-CM

## 2020-10-16 ENCOUNTER — COMMUNICATION - HEALTHEAST (OUTPATIENT)
Dept: FAMILY MEDICINE | Facility: CLINIC | Age: 45
End: 2020-10-16

## 2020-10-20 ENCOUNTER — OFFICE VISIT - HEALTHEAST (OUTPATIENT)
Dept: FAMILY MEDICINE | Facility: CLINIC | Age: 45
End: 2020-10-20

## 2020-10-20 DIAGNOSIS — Z02.89 ENCOUNTER FOR COMPLETION OF FORM WITH PATIENT: ICD-10-CM

## 2020-10-20 DIAGNOSIS — M25.551 CHRONIC PAIN OF RIGHT HIP: ICD-10-CM

## 2020-10-20 DIAGNOSIS — G89.29 CHRONIC PAIN OF RIGHT HIP: ICD-10-CM

## 2020-10-22 ENCOUNTER — VIRTUAL VISIT (OUTPATIENT)
Dept: FAMILY MEDICINE | Facility: OTHER | Age: 45
End: 2020-10-22

## 2020-10-23 DIAGNOSIS — Z20.822 SUSPECTED COVID-19 VIRUS INFECTION: ICD-10-CM

## 2020-10-23 DIAGNOSIS — Z20.822 SUSPECTED COVID-19 VIRUS INFECTION: Primary | ICD-10-CM

## 2020-10-23 PROCEDURE — U0003 INFECTIOUS AGENT DETECTION BY NUCLEIC ACID (DNA OR RNA); SEVERE ACUTE RESPIRATORY SYNDROME CORONAVIRUS 2 (SARS-COV-2) (CORONAVIRUS DISEASE [COVID-19]), AMPLIFIED PROBE TECHNIQUE, MAKING USE OF HIGH THROUGHPUT TECHNOLOGIES AS DESCRIBED BY CMS-2020-01-R: HCPCS | Performed by: FAMILY MEDICINE

## 2020-10-24 LAB
SARS-COV-2 RNA SPEC QL NAA+PROBE: NOT DETECTED
SPECIMEN SOURCE: NORMAL

## 2020-11-11 ENCOUNTER — VIRTUAL VISIT (OUTPATIENT)
Dept: FAMILY MEDICINE | Facility: OTHER | Age: 45
End: 2020-11-11

## 2020-11-11 NOTE — PROGRESS NOTES
"Date: 2020 16:18:15  Clinician: Miek Bustos  Clinician NPI: [id number]  Patient: keesha ochoa  Patient : 1975  Patient Address: [address] [place], [place] [address], [place] [address]  Patient Phone: [phone]  Visit Protocol: URI  Patient Summary:  keesha is a 45 year old ( : 1975 ) male who initiated a [facility] Visit for COVID-19 (Coronavirus) evaluation and screening. When asked the question \"Please sign me up to receive news, health information and promotions. \", keesha responded \"No\".    When asked when his symptoms started, keesha reported that he does not have any symptoms.   He denies taking antibiotic medication in the past month and having recent facial or sinus surgery in the past 60 days.    Pertinent COVID-19 (Coronavirus) information  keesha does not work or volunteer as healthcare worker or a . In the past 14 days, keesha has not worked or volunteered at a healthcare facility or group living setting.   In the past 14 days, he also has not lived in a congregate living setting.   keesha has had a close contact with a laboratory-confirmed COVID-19 patient in the last 14 days. He was not exposed at his work. He does not know when he was exposed to the laboratory-confirmed COVID-19 patient.   Additional information about contact with COVID-19 (Coronavirus) patient as reported by the patient (free text): Don't know. 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   keesha is not living in the same household with the COVID-19 positive patient. He was not in an enclosed space for greater than 15 minutes with the COVID-19 patient.   Since 2019, keesha has been tested for COVID-19 and has not had upper respiratory infection or influenza-like illness.      Result of COVID-19 test: Negative     Date of his COVID-19 test: 10/05/2020      Pertinent medical history  keesha does not need a return to work/school note.   Weight: 250 lbs   keesha does not smoke or use smokeless " tobacco.   Weight: 250 lbs    MEDICATIONS: No current medications, ALLERGIES: NKDA  Clinician Response:  Dear keesha,   Based on your exposure to COVID-19 (coronavirus), we would like to test you for this virus.  1. Please call [phone] to schedule your visit. Explain that you were referred by [facility] to have a COVID-19 test. Be ready to share your [facility] visit ID number.  * If you need to schedule in [facility] please call [phone] or for [employer] employees please call [phone].   * If you need to schedule in the [place] area please call [phone]. [employer] employees call [phone].   The following will serve as your written order for this COVID Test, ordered by me, for the indication of suspected COVID [Z20.828]: The test will be ordered in [employer], our electronic health record, after you are scheduled. It will show as ordered and authorized by Chan Glover MD.  Order: COVID-19 (coronavirus) PCR for ASYMPTOMATIC EXPOSURE testing from [facility].   If you know you have had close contact with someone who tested positive, you should be quarantined for 14 days after this exposure. You should stay in quarantine for the14 days even if the covid test is negative, the optimal time to test after exposure is 5-7 days from the exposure  Quarantine means   What should I do?  For safety, it's very important to follow these rules. Do this for 14 days after the date you were last exposed to the virus..  Stay home and away from others. Don't go to school or anywhere else. Generally quarantine means staying home from work but there are some exceptions to this. Please contact your workplace.   No hugging, kissing or shaking hands.  Don't let anyone visit.  Cover your mouth and nose with a mask, tissue or washcloth to avoid spreading germs.  Wash your hands and face often. Use soap and water.  What are the symptoms of COVID-19?  The most common symptoms are cough, fever and trouble breathing. Less common symptoms include  headache, body aches, fatigue (feeling very tired), chills, sore throat, stuffy or runny nose, diarrhea (loose poop), loss of taste or smell, belly pain, and nausea or vomiting (feeling sick to your stomach or throwing up).  After 14 days, if you have still don't have symptoms, you likely don't have this virus.  If you develop symptoms, follow these guidelines.  If you're normally healthy: Please start another [facility] visit to report your symptoms. Go to [email or web address].  If you have a serious health problem (like cancer, heart failure, an organ transplant or kidney disease): Call your specialty clinic. Let them know that you might have COVID-19.  2. When it's time for your COVID test:  Stay at least 6 feet away from others. (If someone will drive you to your test, stay in the backseat, as far away from the  as you can.)  Cover your mouth and nose with a mask, tissue or washcloth.  Go straight to the testing site. Don't make any stops on the way there or back.  Please note  Caregivers in these groups are at risk for severe illness due to COVID-19:  o People 65 years and older  o People who live in a nursing home or long-term care facility  o People with chronic disease (lung, heart, cancer, diabetes, kidney, liver, immunologic)  o People who have a weakened immune system, including those who:  Are in cancer treatment  Take medicine that weakens the immune system, such as corticosteroids  Had a bone marrow or organ transplant  Have an immune deficiency  Have poorly controlled HIV or AIDS  Are obese (body mass index of 40 or higher)  Smoke regularly  Where can I get more information?   [employer] -- About COVID-19: [email or web address]  CDC -- What to Do If You're Sick: www.cdc.gov/coronavirus/2019-ncov/about/steps-when-sick.html  CDC -- Ending Home Isolation: www.cdc.gov/coronavirus/2019-ncov/hcp/disposition-in-home-patients.html  CDC -- Caring for Someone:  www.cdc.gov/coronavirus/2019-ncov/if-you-are-sick/care-for-someone.html  [facility] -- Interim Guidance for Hospital Discharge to Home: www.health.[facility].[place]/diseases/coronavirus/hcp/hospdischarge.pdf  [facility] clinical trials (COVID-19 research studies): clinicalaffairs.[facility]-clinical-trials  Below are the COVID-19 hotlines at the [place] Department of Health ([facility]). Interpreters are available.  For health questions: Call 499-695-9378 or 1-412.812.8241 (7 a.m. to 7 p.m.)  For questions about schools and childcare: Call 904-981-2288 or 1-890.326.2161 (7 a.m. to 7 p.m.)    Diagnosis: Contact with and (suspected) exposure to other viral communicable diseases  Diagnosis ICD: Z20.828

## 2020-11-22 ENCOUNTER — HEALTH MAINTENANCE LETTER (OUTPATIENT)
Age: 45
End: 2020-11-22

## 2021-04-01 ENCOUNTER — OFFICE VISIT - HEALTHEAST (OUTPATIENT)
Dept: FAMILY MEDICINE | Facility: CLINIC | Age: 46
End: 2021-04-01

## 2021-04-01 DIAGNOSIS — L98.9 SORE ON LEG: ICD-10-CM

## 2021-04-01 DIAGNOSIS — D22.9 MULTIPLE ATYPICAL SKIN MOLES: ICD-10-CM

## 2021-04-01 DIAGNOSIS — R63.4 WEIGHT LOSS: ICD-10-CM

## 2021-04-01 DIAGNOSIS — Z13.1 SCREENING FOR DIABETES MELLITUS: ICD-10-CM

## 2021-04-01 DIAGNOSIS — Z13.220 LIPID SCREENING: ICD-10-CM

## 2021-04-01 LAB
ALBUMIN SERPL-MCNC: 4.2 G/DL (ref 3.5–5)
ALP SERPL-CCNC: 86 U/L (ref 45–120)
ALT SERPL W P-5'-P-CCNC: 26 U/L (ref 0–45)
ANION GAP SERPL CALCULATED.3IONS-SCNC: 5 MMOL/L (ref 5–18)
AST SERPL W P-5'-P-CCNC: 29 U/L (ref 0–40)
BILIRUB SERPL-MCNC: 0.8 MG/DL (ref 0–1)
BUN SERPL-MCNC: 11 MG/DL (ref 8–22)
CALCIUM SERPL-MCNC: 8.8 MG/DL (ref 8.5–10.5)
CHLORIDE BLD-SCNC: 109 MMOL/L (ref 98–107)
CHOLEST SERPL-MCNC: 158 MG/DL
CO2 SERPL-SCNC: 25 MMOL/L (ref 22–31)
CREAT SERPL-MCNC: 0.94 MG/DL (ref 0.7–1.3)
ERYTHROCYTE [DISTWIDTH] IN BLOOD BY AUTOMATED COUNT: 11.9 % (ref 11–14.5)
FASTING STATUS PATIENT QL REPORTED: ABNORMAL
GFR SERPL CREATININE-BSD FRML MDRD: >60 ML/MIN/1.73M2
GLUCOSE BLD-MCNC: 96 MG/DL (ref 70–125)
HCT VFR BLD AUTO: 49.8 % (ref 40–54)
HDLC SERPL-MCNC: 39 MG/DL
HGB BLD-MCNC: 17.5 G/DL (ref 14–18)
LDLC SERPL CALC-MCNC: 96 MG/DL
MCH RBC QN AUTO: 31.2 PG (ref 27–34)
MCHC RBC AUTO-ENTMCNC: 35.1 G/DL (ref 32–36)
MCV RBC AUTO: 89 FL (ref 80–100)
PLATELET # BLD AUTO: 148 THOU/UL (ref 140–440)
PMV BLD AUTO: 8.9 FL (ref 7–10)
POTASSIUM BLD-SCNC: 4.3 MMOL/L (ref 3.5–5)
PROT SERPL-MCNC: 6.8 G/DL (ref 6–8)
RBC # BLD AUTO: 5.61 MILL/UL (ref 4.4–6.2)
SODIUM SERPL-SCNC: 139 MMOL/L (ref 136–145)
TRIGL SERPL-MCNC: 116 MG/DL
TSH SERPL DL<=0.005 MIU/L-ACNC: 1 UIU/ML (ref 0.3–5)
WBC: 9.3 THOU/UL (ref 4–11)

## 2021-04-16 ENCOUNTER — RECORDS - HEALTHEAST (OUTPATIENT)
Dept: ADMINISTRATIVE | Facility: OTHER | Age: 46
End: 2021-04-16

## 2021-05-31 NOTE — TELEPHONE ENCOUNTER
Name of form/paperwork: [employer]  Have you been seen for this request: Yes:  8/1/19  Do we have the form: Yes- patient will fax to [phone] when he returns to work on Friday  When is form needed by: As able  How would you like the form returned: Patient is not sure exactly where the form needs to be returned. Please return to [place]- [employer].  Fax Number: On form  Patient Notified form requests are processed in 3-5 business days: Yes  (If patient needs form sooner, please note that in this message.)  Okay to leave a detailed message? Yes

## 2021-05-31 NOTE — TELEPHONE ENCOUNTER
Controlled Substance Refill Request    Medication: traMADol (ULTRAM) 50 mg tablet  Requested Prescriptions     Pending Prescriptions Disp Refills     traMADol (ULTRAM) 50 mg tablet 14 tablet 0     Sig: Can take 1-2 tablets daily.     Date Last Fill: 8/1/2019  Pharmacy:[id number] - [place] [address] AT [place] [address] & [facility]    Submit electronically to pharmacy  Controlled Substance Agreement on File:   Encounter-Level CSA Scan Date:    There are no encounter-level csa scan date.       Last office visit: Visit date 08/01/2019.

## 2021-05-31 NOTE — TELEPHONE ENCOUNTER
----- Message from AGUSTO Frey sent at 8/2/2019  8:03 AM CDT -----  No acute changes as expected. Will continue with plan for orthopedic follow up.     please help set up.

## 2021-05-31 NOTE — TELEPHONE ENCOUNTER
Left message to call back for: Results  Information to relay to patient:  Unable to leave message, phone line was busy.  Please relay message below to patient.

## 2021-05-31 NOTE — TELEPHONE ENCOUNTER
Called and informed patient that we have not received [facility] paperwork and supplied our fax number again.

## 2021-05-31 NOTE — TELEPHONE ENCOUNTER
Controlled Substance Refill Request  Medication:   Requested Prescriptions     Pending Prescriptions Disp Refills     traMADol (ULTRAM) 50 mg tablet [Pharmacy Med Name: TRAMADOL 50MG TABLETS] 14 tablet 0     Sig: TAKE 1 TO 2 TABLETS BY MOUTH DAILY     Date Last Fill: 8/13/19  Pharmacy: [id number]   Submit electronically to pharmacy  Controlled Substance Agreement on File:   Encounter-Level CSA Scan Date:    There are no encounter-level csa scan date.       Last office visit: Last office visit pertaining to requested medication was 8/1/19.

## 2021-05-31 NOTE — PROGRESS NOTES
ASSESSMENT:  1. Chronic hip pain, right  XR Pelvis W 2 Vw Hip Right    Ambulatory referral to Orthopedics    traMADol (ULTRAM) 50 mg tablet   2. Encounter for medical examination to establish care         PLAN:  Here today to establish care and discuss chronic hip pain.  Previous SI fusion, has had worsening pain over time since then especially in the last few months when he has had to work a different job which has seemed to aggravate his symptoms.  Discussed options, x-ray completed today and negative for fracture or any acute issue.  He does have hardware and so we have proceeded to refer him back to orthopedics for further evaluation and management plan.  Given ongoing severe pain will give him tramadol to use as needed daily, hopefully for short period of time.  If needing longer use would have him return for discussion of controlled substance agreement.    Otherwise healthy male, could return for a full physical once his hip pain is under better control.  No problem-specific Assessment & Plan notes found for this encounter.      There are no Patient Instructions on file for this visit.    Orders Placed This Encounter   Procedures     XR Pelvis W 2 Vw Hip Right     Standing Status:   Future     Number of Occurrences:   1     Standing Expiration Date:   8/1/2020     Order Specific Question:   Can the procedure be changed per Radiologist protocol?     Answer:   Yes     Ambulatory referral to Orthopedics     Referral Priority:   Routine     Referral Type:   Consultation     Referral Reason:   Evaluation and Treatment     Requested Specialty:   Orthopedics     Number of Visits Requested:   1     There are no discontinued medications.    No follow-ups on file.    CHIEF COMPLAINT:  Chief Complaint   Patient presents with     Hip Pain     right hip pain x 1.5 months-pt had hip surgery 3 years ago - New pt       HISTORY OF PRESENT ILLNESS:  Cristofer is a 44 y.o. male here today to establish care.  Patient was at a  "different clinic previously but due to insurance change has started up with us.  He did not do silly had a primary care provider previously.  Patient does have a history of SI joint fusion with hardware about 3 years ago.  He has had ongoing pain for some time and eventually was decided to proceed with surgery.  Since then he has had some pain but it was manageable.  In the last 1-1/2 months it has worsened with a job change.  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  He is concerned that things are worsening again or something is wrong with his previous surgery.  Either way the level of pain he is experiencing he cannot continue with and would like further evaluation and management regarding the low back pain.  Back pain is more in the right side, at this time he does not have sciatica but focused pain in the SI area on the right side.  No specific injury but pain started to worsen when he changed his work patterns.  Essentially otherwise healthy male, no other chronic medical conditions.    REVIEW OF SYSTEMS:   Pertinent items are noted in HPI.  All other systems are negative    PFSH:  Reviewed, no changes    TOBACCO USE:  Social History     Tobacco Use   Smoking Status Former Smoker   Smokeless Tobacco Former User     Types: Chew   Tobacco Comment    quit 10 yrs ago       VITALS:  Vitals:    08/01/19 1002   BP: 125/81   Patient Site: Left Arm   Patient Position: Sitting   Cuff Size: Adult Regular   Pulse: 70   Resp: 16   Temp: 97.5  F (36.4  C)   TempSrc: Oral   Weight: (!) 245 lb (111.1 kg)   Height: 5' 11\" (1.803 m)     Wt Readings from Last 3 Encounters:   08/01/19 (!) 245 lb (111.1 kg)       PHYSICAL EXAM:   /81 (Patient Site: Left Arm, Patient Position: Sitting, Cuff Size: Adult Regular)   Pulse 70   " "Temp 97.5  F (36.4  C) (Oral)   Resp 16   Ht 5' 11\" (1.803 m)   Wt (!) 245 lb (111.1 kg)   BMI 34.17 kg/m    General appearance: alert, appears stated age and cooperative  Neck: no adenopathy, no carotid bruit, no JVD, supple, symmetrical, trachea midline and thyroid not enlarged, symmetric, no tenderness/mass/nodules  Back: symmetric, no curvature. ROM normal. No CVA tenderness.  Right SI joint tenderness to palpation  Lungs: clear to auscultation bilaterally  Heart: regular rate and rhythm, S1, S2 normal, no murmur, click, rub or gallop  Extremities: extremities normal, atraumatic, no cyanosis or edema  Pulses: 2+ and symmetric  Neurologic: Grossly normal    DATA REVIEWED:  Additional History from Old Records Summarized (2): previous orthopedic and [facility] med records  Decision to Obtain Records (1): 0  Radiology Tests Summarized or Ordered (1): xray ordered and previous reviewed.   Labs Reviewed or Ordered (1): 0  Medicine Test Summarized or Ordered (1): 0  Independent Review of EKG or X-RAY(2 each): 0    The visit lasted a total of 25 minutes face to face with the patient. Over 50% of the time was spent counseling and educating the patient about plan of care.    MEDICATIONS:  Current Outpatient Medications   Medication Sig Dispense Refill     traMADol (ULTRAM) 50 mg tablet Can take 1-2 tablets daily. 14 tablet 0     No current facility-administered medications for this visit.        This note has been dictated using voice recognition software. Any grammatical or context distortions are unintentional and inherent to the software  "

## 2021-06-02 NOTE — PROGRESS NOTES
"[facility] Clinic Note    Name: Cristofer Del Cid  : 1975   MRN: [id number]    Cristofer Del Cid is a 44 y.o. male presenting to discuss the following:     CC:   Chief Complaint   Patient presents with     [facility]     Fmla Paperwork       HPI:  History of SI joint fusion 4-5 years ago, Dr. Nelson Chaidez, recently retired. Saw someone at [facility], didn't think exam was helpful, focused on back and not area of pain.    Has previously completed PT and is doing exercises now, helpful. But has flares of pain and has had to call into work. If needs to call in again, will get in trouble.    Works as a . On his feet all day. Typically flares last 1-2 days. Hard to walk and stand during flares.     ROS:   See HPI above.     PMH:   Patient Active Problem List   Diagnosis     Chronic pain of right hip     Lumbar radiculopathy     Piriformis syndrome       History reviewed. No pertinent past medical history.    PSH:   Past Surgical History:   Procedure Laterality Date     sacral Right 2014    Right SI fusion with Dr. Chaidez, records in Care Everywhere       MEDICATIONS:   Current Outpatient Medications on File Prior to Visit   Medication Sig Dispense Refill     [DISCONTINUED] traMADol (ULTRAM) 50 mg tablet TAKE 1 TO 2 TABLETS BY MOUTH DAILY 14 tablet 0     No current facility-administered medications on file prior to visit.        ALLERGIES:  Allergies   Allergen Reactions     Gabapentin Other (See Comments)     Drowsiness       FAMHx:  History reviewed. No pertinent family history.    SOCIAL HISTORY:   Social History     Tobacco Use     Smoking status: Former Smoker     Packs/day: 0.00     Smokeless tobacco: Former User     Tobacco comment: quit 10 yrs ago   Substance Use Topics     Alcohol use: Not Currently     Drug use: Never       PHYSICAL EXAM:   /88   Pulse 80   Resp 16   Ht 5' 11\" (1.803 m)   Wt (!) 251 lb 9 oz (114.1 kg)   BMI 35.09 kg/m     GENERAL: Cristofer is a pleasant, well " appearing male in no acute distress.   HEART: Regular rate and rhythm, no murmurs.   LUNGS: Clear to auscultation bilaterally, unlabored.     ASSESSMENT & PLAN:   Cristofer Del Cid is a 44 y.o. male presenting today for completion of FMLA paperwork.    1. Chronic pain of right hip  2. Encounter for completion of form with patient  Cristofer has chronic intermittent low back (SI) joint pain s/p SI joint fusion. He is participating in previously prescribed physical therapy exercises. Paperwork completed for intermittent brief JUAN ALBERTO for flares in symptoms.     RTC: 1 year - annual physical exam     Maggi Ndiaye DO

## 2021-06-03 VITALS
HEIGHT: 71 IN | BODY MASS INDEX: 35.22 KG/M2 | WEIGHT: 251.56 LBS | RESPIRATION RATE: 16 BRPM | HEART RATE: 80 BPM | DIASTOLIC BLOOD PRESSURE: 88 MMHG | SYSTOLIC BLOOD PRESSURE: 122 MMHG

## 2021-06-03 VITALS — WEIGHT: 245 LBS | BODY MASS INDEX: 34.3 KG/M2 | HEIGHT: 71 IN

## 2021-06-04 VITALS
HEART RATE: 94 BPM | BODY MASS INDEX: 35.79 KG/M2 | DIASTOLIC BLOOD PRESSURE: 62 MMHG | TEMPERATURE: 98.1 F | OXYGEN SATURATION: 97 % | WEIGHT: 256.6 LBS | SYSTOLIC BLOOD PRESSURE: 102 MMHG

## 2021-06-04 VITALS
DIASTOLIC BLOOD PRESSURE: 80 MMHG | BODY MASS INDEX: 36.28 KG/M2 | RESPIRATION RATE: 20 BRPM | HEIGHT: 71 IN | HEART RATE: 76 BPM | WEIGHT: 259.13 LBS | SYSTOLIC BLOOD PRESSURE: 110 MMHG

## 2021-06-04 VITALS — HEIGHT: 71 IN | BODY MASS INDEX: 35 KG/M2 | WEIGHT: 250 LBS

## 2021-06-05 VITALS
OXYGEN SATURATION: 98 % | RESPIRATION RATE: 16 BRPM | WEIGHT: 234.6 LBS | HEART RATE: 77 BPM | DIASTOLIC BLOOD PRESSURE: 67 MMHG | SYSTOLIC BLOOD PRESSURE: 114 MMHG | BODY MASS INDEX: 32.72 KG/M2 | TEMPERATURE: 98.3 F

## 2021-06-06 NOTE — PROGRESS NOTES
"[facility] Note    Name: Cristofer Del Cid  : 1975   MRN: [id number]    Cristofer Del Cid is a 45 y.o. male presenting to discuss the following:     CC:   Chief Complaint   Patient presents with     Referral       HPI:  History of flat feet, walking 10 miles a day at work, feet are painful, has tried OTC insoles, would like referral to podiatry for evaluation of custom orthotics.     Additionally, requests refill of Viagra.  Has used previously for erectile dysfunction and found effective.  Denies any issues of chest pain, shortness of breath, lightheadedness, or dizziness.    ROS:   See HPI above.    PMH:   Patient Active Problem List   Diagnosis     Chronic pain of right hip     Lumbar radiculopathy     Piriformis syndrome     No past medical history on file.    PSH:   Past Surgical History:   Procedure Laterality Date     sacral Right 2014    Right SI fusion with Dr. Chaidez, records in Care Everywhere     MEDICATIONS:   No current outpatient medications on file prior to visit.     No current facility-administered medications on file prior to visit.      ALLERGIES:  Allergies   Allergen Reactions     Gabapentin Other (See Comments)     Drowsiness     PHYSICAL EXAM:   /80   Pulse 76   Resp 20   Ht 5' 11\" (1.803 m)   Wt (!) 259 lb 2 oz (117.5 kg)   BMI 36.14 kg/m     GENERAL: Cristofer is a pleasant, nontoxic-appearing male, in no acute distress.  HEART: Regular rate and rhythm, no murmurs.  LUNGS: Clear to auscultation bilaterally, unlabored.  MSK: Bilateral flat feet.    ASSESSMENT & PLAN:   Cristofer Del Cid is a 45 y.o. male presenting today for referral to podiatry and for management of erectile dysfunction.    1. Pain in both feet  - Ambulatory referral to Podiatry    Pain bilateral feet exacerbated due to extended hours on his feet at work.  Has tried over-the-counter supports without success.  He requests referral to podiatry for evaluation of custom insoles.  Referral placed.    2. Erectile " dysfunction, unspecified erectile dysfunction type  - sildenafiL (VIAGRA) 50 MG tablet; Take 1 tablet (50 mg total) by mouth as needed for erectile dysfunction.  Dispense: 20 tablet; Refill: 1     Requests refill of Viagra.  This is been successful in the past.  Discussed good Rx website for discounted coupons.  Printed prescription to use with coupon.  Counseled to discontinue use of develop signs of chest pain, shortness of breath, lightheadedness, dizziness, or vision changes.  He has not had his cholesterol levels checked but is not fasting today.  Would recommend returning for physical for screening lipids and A1c.    RTC: October 2020 -annual physical exam /sooner as needed    Maggi Ndiaye, DO

## 2021-06-07 NOTE — PROGRESS NOTES
ASSESSMENT AND PLAN:    see separate notes attached to this visit regarding details of  video visit and time spent.    Problem List Items Addressed This Visit        Unprioritized    ED (erectile dysfunction)     Patient asks if he can try Cialis instead of Viagra for erectile dysfunction.  I do see Viagra listed in meds, the change has francheska made.          Relevant Medications    tadalafiL (CIALIS) 10 MG tablet    Gastroesophageal reflux disease without esophagitis - Primary     The pathophysiology of reflux is discussed.  Anti-reflux measures such as raising the head of the bed, avoiding tight clothing or belts, avoiding eating late at night and not lying down shortly after mealtime and achieving weight loss are discussed. Avoid ASA, NSAID's, caffeine, peppermints, alcohol and tobacco. OTC H2 blockers and/or antacids are often very helpful for PRN use. However, for persisting chronic or daily symptoms, prescription strength H2 blockers or a trial of PPI's are often used. Further recommendations to him: Rx for PPI is written. He should alert me if there are persistent symptoms, dysphagia, weight loss or GI bleeding. FUV is scheduled.          Relevant Medications    esomeprazole (NEXIUM) 40 MG capsule           Chief Complaint   Patient presents with     Gastroesophageal Reflux     x 3 weeks. Tried OTC and it temporarily helps but always keeps coming back        HPI  Cristofer Del Cid is a 45 y.o. male  saying he has been getting a lot of acid reflux. He took something for it temporarily and then he no longer needed the med.      He has been taking famotidine but it is not working strong enough.     He has the sensation of having to burp and then he gets discomfort/ acidic feeling in his sternum area. He get this 4-5 time a day.    Also wants to swich his viagra to cialis to see if he likes that better for his Erectile dysfunction    Social History     Tobacco Use   Smoking Status Former Smoker     Packs/day: 0.00    Smokeless Tobacco Former User   Tobacco Comment    quit 10 yrs ago      Current Outpatient Medications on File Prior to Visit   Medication Sig Dispense Refill     sildenafiL (VIAGRA) 50 MG tablet Take 1 tablet (50 mg total) by mouth as needed for erectile dysfunction. 20 tablet 1     No current facility-administered medications on file prior to visit.       Allergies   Allergen Reactions     Gabapentin Other (See Comments)     Drowsiness       Review of Systems   Constitutional: Negative.    HENT: Negative.    Eyes: Negative.    Respiratory: Negative.    Cardiovascular: Negative.    Gastrointestinal: Negative.    Endocrine: Negative.    Genitourinary: Negative.    Musculoskeletal: Negative.    Skin: Negative.    Neurological: Negative.    Hematological: Negative.    Psychiatric/Behavioral: Negative.         OBJECTIVE: There were no vitals taken for this visit.   Physical Exam  Constitutional:       General: He is not in acute distress.     Appearance: He is well-developed.   HENT:      Head: Normocephalic and atraumatic.   Eyes:      Conjunctiva/sclera: Conjunctivae normal.   Neck:      Musculoskeletal: Neck supple.   Pulmonary:      Effort: Pulmonary effort is normal.   Musculoskeletal: Normal range of motion.   Neurological:      Mental Status: He is alert and oriented to person, place, and time.          Additional History from Old Records Summarized (2): no  Decision to Obtain Records (1): no  Radiology Tests Summarized or Ordered (1): no  Labs Reviewed or Ordered (1): no  Medicine Test Summarized or Ordered (1): yes  Independent Review of EKG or X-RAY(2 each): no    This note was created using Dragon dictation.  Please excuse any grammatical errors.

## 2021-06-07 NOTE — PROGRESS NOTES
"Cristofer Del Cid is a 45 y.o. male who is being evaluated via a billable video visit.      The patient has been notified of following:     \"This video visit will be conducted via a call between you and your physician/provider. We have found that certain health care needs can be provided without the need for an in-person physical exam.  This service lets us provide the care you need with a video conversation.  If a prescription is necessary we can send it directly to your pharmacy.  If lab work is needed we can place an order for that and you can then stop by our lab to have the test done at a later time.    Video visits are billed at different rates depending on your insurance coverage. Please reach out to your insurance provider with any questions.    If during the course of the call the physician/provider feels a video visit is not appropriate, you will not be charged for this service.\"    Patient has given verbal consent to a Video visit? Yes    Patient would like to receive their AVS by AVS Preference: Mail a copy.    Patient would like the video invitation sent by: Text to cell phone: [phone]    Will anyone else be joining your video visit? No      Video Start Time: 2:07 PM    Additional provider notes: see separate notes attached to this visit       Video-Visit Details    Type of service:  Video Visit    Video End Time (time video stopped): 2:25 PM  Originating Location (pt. Location): Home    Distant Location (provider location):  [facility] FAMILY MEDIC[facility]/OB     Mode of Communication:  Video Conference via Meliza Rodriguez MD    "

## 2021-06-08 NOTE — TELEPHONE ENCOUNTER
Problem List Items Addressed This Visit        Unprioritized    Gastroesophageal reflux disease without esophagitis - Primary     nexium switched to pantoprazole ec 40 mg po q day #30 w 1 rf due to insurance exclusion of nexium         Relevant Medications    pantoprazole (PROTONIX) 40 MG tablet

## 2021-06-08 NOTE — TELEPHONE ENCOUNTER
Checked on status of request, this is still in progress.  Per [employer] rep responses can take up 1-5 business days.  We will receive a faxed determination.

## 2021-06-08 NOTE — PROGRESS NOTES
"Cristofer Del Cid is a 45 y.o. male who is being evaluated via a billable video visit.      The patient has been notified of following:     \"This video visit will be conducted via a call between you and your physician/provider. We have found that certain health care needs can be provided without the need for an in-person physical exam.  This service lets us provide the care you need with a video conversation.  If a prescription is necessary we can send it directly to your pharmacy.  If lab work is needed we can place an order for that and you can then stop by our lab to have the test done at a later time.    Video visits are billed at different rates depending on your insurance coverage. Please reach out to your insurance provider with any questions.    If during the course of the call the physician/provider feels a video visit is not appropriate, you will not be charged for this service.\"    Patient has given verbal consent to a Video visit? Yes    Will anyone else be joining your video visit? No        Video Start Time: 4:32 PM and had immediate difficulties    Additional provider notes:     Assessment/Plan:     Problem List Items Addressed This Visit     None      Visit Diagnoses     Acute conjunctivitis of left eye, unspecified acute conjunctivitis type    -  Primary    Given symptoms as well as description will treat as per orders.  Side effects precautions discussed.  Warning signs and symptoms for early return to clinic or emergency room discussed.    Relevant Medications    polymyxin B-trimethoprim (FOR POLYTRIM) 10,000 unit- 1 mg/mL Drop ophthalmic drops        Return for recheck in 5-7 days if not improving.    Subjective:   45 y.o. male presents for left eye irritation with discharge.  Started yesterday after mowing the lawn.  He does not recall anything flying into his eye.  He does not work with flying objects such as saws, paints.  Has no eye pain or double vision.  No fevers or chills.  No one else in the " "family has had pinkeye.  Had green matting that was also slightly brownish across the eye this morning that he needed a washcloth to clear off.        Review of Systems   Constitutional: Negative for chills, fatigue and fever.   HENT: Negative for ear discharge, ear pain, hearing loss, postnasal drip, sinus pressure, sinus pain, sneezing, sore throat and voice change.    Eyes: Positive for discharge and redness. Negative for photophobia, pain, itching and visual disturbance.   Respiratory: Negative for cough.         History     Reviewed By Date/Time Sections Reviewed    Richar Hector DO 6/11/2020  4:31 PM Medical, Surgical, Tobacco, Family, Socioeconomic    StewartSebastián Jr., [facility] 6/11/2020  3:58 PM Tobacco           Objective:     Vitals:    06/11/20 1558   Weight: (!) 250 lb (113.4 kg)   Height: 5' 11\" (1.803 m)     Physical Exam  No physical exam as this was converted to telephone after video failure  This note has been dictated using voice recognition software. Any grammatical or context distortions are unintentional and inherent to the software    Video-Visit Details converted to telephone visit due to technical difficulties    Type of service:  Video Visit converted to telephone call    Video End Time (time video stopped):   Originating Location (pt. Location): Home    Distant Location (provider location):  [facility] FAM[facility] MEDICINE/OB     Platform used for Video Visit: [employer] change to [employer] phone application    Total time of phone call: 7 minutes 42 seconds  Richar Hector DO  "

## 2021-06-08 NOTE — TELEPHONE ENCOUNTER
Central PA team  [phone]  Pool: HE PA MED ([id number])          PA has been initiated.       PA form completed and faxed insurance via Cover My Meds     Key:  [id number]     Medication:  NEXIUM 40MG     Insurance:  ALONDRA        Response will be received via fax and may take up to 5-10 business days depending on plan      [id number]

## 2021-06-08 NOTE — TELEPHONE ENCOUNTER
Who is calling: Patient   Reason for Call:  Checking status on prior auth  Date of last appointment with primary care:  4/28/20  Has the patient been recently seen:  Yes  Okay to leave a detailed message: Yes

## 2021-06-09 NOTE — PROGRESS NOTES
"Cristofer Del Cid is a 45 y.o. male who is being evaluated via a billable video visit.      The patient has been notified of following:     \"This video visit will be conducted via a call between you and your physician/provider. We have found that certain health care needs can be provided without the need for an in-person physical exam.  This service lets us provide the care you need with a video conversation.  If a prescription is necessary we can send it directly to your pharmacy.  If lab work is needed we can place an order for that and you can then stop by our lab to have the test done at a later time.    Video visits are billed at different rates depending on your insurance coverage. Please reach out to your insurance provider with any questions.    If during the course of the call the physician/provider feels a video visit is not appropriate, you will not be charged for this service.\"    Patient has given verbal consent to a Video visit? Yes  How would you like to obtain your AVS? AVS Preference: [employer].  Patient would like the video invitation sent by: Other e-mail: [employer]  Will anyone else be joining your video visit? No        Video Start Time: 8:22 AM    Video-Visit Details    Type of service:  Video Visit    Video End Time (time video stopped): 8:39 AM  Originating Location (pt. Location): Home    Distant Location (provider location):  [facility] FAMILY MEDICINE/OB     Platform used for Video Visit: [facility] Note    Name: Cristofer Del Cid  : 1975   MRN: [id number]    Cristofer Del Cid is a 45 y.o. male presenting to discuss the following:     CC:   Chief Complaint   Patient presents with     Fatigue     Spent all day Saturday in the garage.     Nausea     Dizziness       HPI:  Worked out in the garage all day on Saturday. Started to feel faint and sick. Went into the house and slept for the last few days with an upset stomach. Has been drinking a lot of water and increase fluids. "     Had a herpes outbreak on top of symptoms.     Still feeling faint and sick, went to get in car and felt dizzy from hot car. Went home, took cold shower instead.    Has been checking temperature and was normal.    Has used acyclovir to treat herpes infection, is resolving.     Went over to friends house on Thursday to work on things, had COVID testing on Monday and was negative.     Quit smoking 10 years ago. No history of asthma, history of recurrent bronchitis.     Feeling lightheaded when standing up, feels lightheaded when going outside and sits down/stands up, when out in the heat feels lightheaded. No stairs, unsure if dyspnea with exertion    No known tick bites.     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      ROS:   CONSTITUTIONAL: Appetite is poor  HEENT: Taste/smell is normal, mild rhinorrhea, no sore throat   HEART: chest pain when outside and hot out, feels tight.   LUNGS: Feeling short of breath outside.   ABDOMEN: Nausea per above, no diarrhea or vomiting  : no urinary symptoms  DERM: No rashes or skin changes   MSK: no myalgias or arthralgias   NEURO: No numbness, no weakness, no difficulty speaking    PMH:   Patient Active Problem List   Diagnosis     Chronic pain of right hip     Lumbar radiculopathy     Piriformis syndrome     ED (erectile dysfunction)     Gastroesophageal reflux disease without esophagitis       No past medical history on file.    PSH:   Past Surgical History:   Procedure Laterality Date     sacral Right 05/2014    Right SI fusion with Dr. Chaidez, records in Care Everywhere       MEDICATIONS:   Current Outpatient Medications on File Prior to Visit   Medication Sig Dispense Refill     pantoprazole (PROTONIX) 40 MG tablet Take 1 tablet (40 mg total) by mouth daily. 30 tablet 1     polymyxin B-trimethoprim (FOR POLYTRIM) 10,000 unit- 1 mg/mL Drop ophthalmic drops 1-2 drops to the affected eye(s) 3 times a day for 7 days 1 Bottle 0      sildenafiL (VIAGRA) 50 MG tablet Take 1 tablet (50 mg total) by mouth as needed for erectile dysfunction. 20 tablet 1     tadalafiL (CIALIS) 10 MG tablet Take 1 tablet (10 mg total) by mouth as needed for erectile dysfunction. 20 tablet 1     No current facility-administered medications on file prior to visit.        ALLERGIES:  Allergies   Allergen Reactions     Gabapentin Other (See Comments)     Drowsiness       FAMHx:  No family history on file.    SOCIAL HISTORY:   Social History     Tobacco Use     Smoking status: Former Smoker     Packs/day: 0.00     Smokeless tobacco: Former User     Tobacco comment: quit 10 yrs ago   Substance Use Topics     Alcohol use: Not Currently     Drug use: Never       PHYSICAL EXAM:   Unable to obtain vitals due to virtual visit encounter   GENERAL: alert, no distress and fatigued  EYES: Eyes grossly normal to inspection. No discharge or erythema, or obvious scleral/conjunctival abnormalities.  RESP: No audible wheeze, cough, or visible cyanosis.  No visible retractions or increased work of breathing.    NEURO: Cranial nerves grossly intact. Mentation and speech appropriate for age.  PSYCH: mentation appears normal and fatigued    ASSESSMENT & PLAN:     1. Cough  2. Shortness of breath  - Symptomatic COVID-19 Virus (CORONAVIRUS) PCR; Future  - albuterol (PROAIR HFA;PROVENTIL HFA;VENTOLIN HFA) 90 mcg/actuation inhaler; Inhale 2 puffs every 6 (six) hours as needed for wheezing.  Dispense: 1 each; Refill: 0    3. Nausea  - ondansetron (ZOFRAN-ODT) 4 MG disintegrating tablet; Take 1 tablet (4 mg total) by mouth every 8 (eight) hours as needed for nausea.  Dispense: 9 tablet; Refill: 0       I am concerned about Cristofer's symptoms.  Although heat related illness is a good hypothesis at onset as he was working outside in the garage very hot and humid weather, I would anticipate his symptoms would have resolved since he is spent a fair amount of time inside and his temperature is currently  normal.    Also considered neurological etiology, but he denies any other focal neurological complaints.    Despite a negative COVID test from Monday, I still highly suspect potential COVID infection with known exposure, symptoms consistent with COVID illness, and swab early in the course of infection.  It may have been too early to test for high enough viral load to detect illness.  Also discussed with patient fairly significant false-negative rate.    He will trial albuterol inhaler with history of recurrent bronchitis in childhood and symptoms of chest tightness and shortness of breath.  We will also trial Zofran empirically for management of underlying nausea.  Continue to monitor symptoms.  He does have an pulse ox machine at home.  Recommend he check his oxygen saturation and temperature a few times a day.  If oxygen saturation is less than 90%, he should present to the emergency department for further evaluation and management.    I will call Cristofer over the weekend to check in and see how he is feeling with our empiric treatments.  Repeat COVID testing order placed.    RTC: 1 week - if not improving    Maggi Ndiaye DO     ADDENDUM: I reached out to Cristofer 2 times on Sunday and 1 time today without any answer to see how he is doing.  Left a vague voicemail without any patient identifying information that I hope he is feeling better and he can reach back out by my chart or phone call if he needs any other assistance.    Maggi Ndiaye DO

## 2021-06-10 ENCOUNTER — OFFICE VISIT - HEALTHEAST (OUTPATIENT)
Dept: FAMILY MEDICINE | Facility: CLINIC | Age: 46
End: 2021-06-10

## 2021-06-10 DIAGNOSIS — K21.9 GASTROESOPHAGEAL REFLUX DISEASE WITHOUT ESOPHAGITIS: ICD-10-CM

## 2021-06-10 ASSESSMENT — PATIENT HEALTH QUESTIONNAIRE - PHQ9: SUM OF ALL RESPONSES TO PHQ QUESTIONS 1-9: 1

## 2021-06-10 NOTE — TELEPHONE ENCOUNTER
Patient reports in the past couple days, he has had cramping in the left calf.  Patient says it hurts to bend and get up.  Current pain level is 3-4/10 but says it was an 8/10 earlier in the day.  Patient also noticed some numbness in his toes and leg is slightly swollen.  Patient does not have a fever or any redness in the back of the calf. Reviewed care advice with caller per RN triage protocol for evaluation within 4 hrs.  Caller verbalized understanding and agrees to be seen.      Additional Information    Negative: Looks like a broken bone or dislocated joint (e.g., crooked or deformed)    Negative: Sounds like a life-threatening emergency to the triager    Negative: Chest pain    Negative: Difficulty breathing    Negative: Entire foot is cool or blue in comparison to other side    Negative: Unable to walk    Negative: [1] Red area or streak AND [2] fever    Negative: [1] Swollen joint AND [2] fever    Negative: [1] Cast on leg or ankle AND [2] now increased pain    Negative: Patient sounds very sick or weak to the triager    Negative: [1] SEVERE pain (e.g., excruciating, unable to do any normal activities) AND [2] not improved after 2 hours of pain medicine    [1] Thigh or calf pain AND [2] only 1 side AND [3] present > 1 hour    Protocols used: LEG PAIN-A-AH

## 2021-06-11 NOTE — PROGRESS NOTES
"Cristofer Del Cid is a 45 y.o. male who is being evaluated via a billable telephone visit.      The patient has been notified of following:     \"This telephone visit will be conducted via a call between you and your physician/provider. We have found that certain health care needs can be provided without the need for a physical exam.  This service lets us provide the care you need with a short phone conversation.  If a prescription is necessary we can send it directly to your pharmacy.  If lab work is needed we can place an order for that and you can then stop by our lab to have the test done at a later time.    Telephone visits are billed at different rates depending on your insurance coverage. During this emergency period, for some insurers they may be billed the same as an in-person visit.  Please reach out to your insurance provider with any questions.    If during the course of the call the physician/provider feels a telephone visit is not appropriate, you will not be charged for this service.\"    Patient has given verbal consent to a Telephone visit? Yes    What phone number would you like to be contacted at? [phone]    Patient would like to receive their AVS by AVS Preference: Juancarlos.    Subjective:   Working as a  for [employer]. He is currently working in a detention.   Is on FMLA. He said due to COVID, this was good through December 31, 2020. He has been on this since April 13th. Initially was approved by Dr. Gonzalez. Name on the form is Rogelio Maria Eugenia.     Now is wanting the short term disability form completed so that he can get paid. He wants to be out through the end of the year due to high risk due to asthma. (Goes by the name of Nelson).     States that his son is high risk. Work doesn't want him to be present if son is high risk.     Wife has also had Nelson's doctor complete paperwork.    Assessment & Plan:   Reviewed CDC guidelines for high risk patients. Cristofer's son does not meet " criteria for high risk.  I certainly do not think using short term disability when nobody is actively sick and by current guidelines nobody is high risk would be a good use of resources.   Offered to write letter requesting employer work with Cristofer to most take reasonable steps to reduce risk of exposure.     Maggi Ndiaye, DO    Telephone visit: 8 minutes

## 2021-06-12 NOTE — PROGRESS NOTES
"Cristofer Del Cid is a 45 y.o. male who is being evaluated via a billable video visit.      The patient has been notified of following:     \"This video visit will be conducted via a call between you and your physician/provider. We have found that certain health care needs can be provided without the need for an in-person physical exam.  This service lets us provide the care you need with a video conversation.  If a prescription is necessary we can send it directly to your pharmacy.  If lab work is needed we can place an order for that and you can then stop by our lab to have the test done at a later time.    Video visits are billed at different rates depending on your insurance coverage. Please reach out to your insurance provider with any questions.    If during the course of the call the physician/provider feels a video visit is not appropriate, you will not be charged for this service.\"    Patient has given verbal consent to a Video visit? Yes  How would you like to obtain your AVS? AVS Preference: [email or web address].  If dropped by the video visit, the video invitation should be sent to: Other e-mail: [employer]  Will anyone else be joining your video visit? No        Video Start Time: 1:43 PM    Video-Visit Details    Type of service:  Video Visit    Video End Time (time video stopped): 1:50 PM  Originating Location (pt. Location): Home    Distant Location (provider location):  [facility]     Platform used for Video Visit: [employer]     There was a failure in audio so video visit was abandoned for telephone visit.     SUBJECTIVE: Cristofer has FMLA paperwork completed annually due to prior SI joint fusion surgery. He has intermittent flares of pain requiring absence from work. He states these flares have been happening more frequently, thinks he may need 3-4 episodes her month instead of 1-2. He has tried to follow up with ortho in the past, but they tell him it is musculoskeletal. He has also done PT in " the past. He would like renewal of his paperwork. When not in a flare, he is able to work without any restrictions.    OBJECTIVE: Unable to obtain vitals or perform physical exam due to virtual visit. For brief period on video, patient appears well, in no distress. Voice is calm, able to speak in complete sentences, no respiratory distress.    ASSESSMENT:   1. Encounter for completion of form with patient  2. Chronic pain of right hip    PLAN:     I did renew his [facility] paperwork today, using last year's version as a template and filling in updates with patient history.     Discussed with Cristofer that if symptoms are worsening at all, he will need to reassess with ortho for further evaluation.   I would also recommend re-evaluation with PT if requiring frequent absences from work.    HM: Recommend flu shot at pharmacy as flu shot schedule in clinic is very limited.     RTC: Recommend annual physical exam with labs in March 2021    Maggi Ndiaye DO

## 2021-06-16 PROBLEM — N52.9 ED (ERECTILE DYSFUNCTION): Status: ACTIVE | Noted: 2020-04-28

## 2021-06-16 PROBLEM — K21.9 GASTROESOPHAGEAL REFLUX DISEASE WITHOUT ESOPHAGITIS: Status: ACTIVE | Noted: 2020-04-28

## 2021-06-16 NOTE — PROGRESS NOTES
Name: Cristofer Del Cid  : 1975   MRN: [id number]    ASSESSMENT & PLAN:   Cristofer Del Cid is a 46 y.o. male presenting today for evaluation of skin changes and weight loss.     1. Multiple atypical skin moles  - Ambulatory referral to Dermatology    Given number of pigmented lesions, I do recommend a formal skin check by dermatology.  Recommend paying particular attention to abnormal lesion mid upper back.    2. Sore on leg  - HM2(CBC w/o Differential)    Sores on leg more consistent with benign abrasions.  Healing well.  Differential diagnosis would be more of an excoriated eczema, however he denies pruritus.  Anticipate lesions will heal over well.  Will obtain CBC today to rule out platelet abnormalities.    3. Lipid screening  - Lipid Cascade RANDOM  - Comprehensive Metabolic Panel    Nonfasting sample.    4. Screening for diabetes mellitus  - Comprehensive Metabolic Panel    Nonfasting sample.    5. Weight loss  - Thyroid [place]     Cristofer endorses intentional weight loss with lifestyle modifications.  No red flag symptoms present.  Will rule out thyroid disease.  Continue to monitor.  If symptoms are persistent, developing night sweats, anemia identified on CBC, or bowel changes, would recommend proceeding with colonoscopy to rule out malignancy.    Return in about 8 weeks (around 2021) for persistent weight loss / sooner as needed.    Maggi Ndiaye DO  [facility]          Cristofer Del Cid is a 46 y.o. male presenting to discuss the following:     CC:   Chief Complaint   Patient presents with     Nevus     Check moles that are changing colors.      Sores     Gets sores on his legs and not sure what they are or where they come from.        HPI:  Cristofer presents today for evaluation of moles.  He has lots of moles.  Wife is noticed concern for mole above his right lateral eyebrow as well as his right cheek.  States these are new.  Uniform in color, do not seem to be changing.   Does not really check the rest of his body.    Also notes some sores on his lower legs.  He has been physically active at work, does not remember any specific trauma.  Lesions are scabbed over.    States he is due for a physical.  Is agreeable to some blood work today.    Does endorse weight loss recently.  Thinks this is due to decreased stress at work as he currently is not working.  Previously worked at the senior living.  Also endorses increased physical activity and dietary modifications. Denies any stool changes.     ROS:   See HPI above.     PMH:   Patient Active Problem List   Diagnosis     Chronic pain of right hip     Lumbar radiculopathy     Piriformis syndrome     ED (erectile dysfunction)     Gastroesophageal reflux disease without esophagitis       History reviewed. No pertinent past medical history.    PSH:   Past Surgical History:   Procedure Laterality Date     sacral Right 05/2014    Right SI fusion with Dr. Chaidez, records in Care Everywhere         MEDICATIONS:   Current Outpatient Medications on File Prior to Visit   Medication Sig Dispense Refill     albuterol (PROAIR HFA;PROVENTIL HFA;VENTOLIN HFA) 90 mcg/actuation inhaler Inhale 2 puffs every 6 (six) hours as needed for wheezing. 1 each 0     tadalafiL (CIALIS) 10 MG tablet Take 1 tablet (10 mg total) by mouth as needed for erectile dysfunction. 20 tablet 1     No current facility-administered medications on file prior to visit.        ALLERGIES:  Allergies   Allergen Reactions     Gabapentin Other (See Comments)     Drowsiness       FAMHx:  No family history on file.    SOCIAL HISTORY:   Social History     Tobacco Use     Smoking status: Former Smoker     Packs/day: 0.00     Smokeless tobacco: Former User     Tobacco comment: quit 10 yrs ago   Substance Use Topics     Alcohol use: Not Currently     Drug use: Never         PHYSICAL EXAM:   /67   Pulse 77   Temp 98.3  F (36.8  C) (Oral)   Resp 16   Wt (!) 234 lb 9.6 oz (106.4 kg)   SpO2 98%    BMI 32.72 kg/m     GENERAL: Cristofer is a pleasant, nontoxic-appearing male, no acute distress.  HEENT: Sclera white, no cervical lymphadenopathy, no thyromegaly.  HEART: Regular rate and rhythm, no murmurs.  LUNGS: Clear to auscultation bilaterally, unlabored.  ABDOMEN: Abdomen soft, nontender to palpation.  No palpable masses.  DERM: Uniform color papule above right eyebrow less than 6 mm in diameter, regular border.  Another benign-appearing macule right preauricular region with smooth borders and uniform color, approximately 5 mm in diameter.  He does have a large irregular, multicolored brown pigmented patch mid upper back which appears more concerning for dysplastic lesion.

## 2021-06-16 NOTE — PROGRESS NOTES
Normal CBC, CMP, lipid Cascade, thyroid cascade.  Patient updated by [employer].  The 10-year ASCVD risk score (Radha AMI Jr., et al., 2013) is: 1.3%  Maggi Ndiaye DO

## 2021-06-17 NOTE — TELEPHONE ENCOUNTER
Telephone Encounter by Tamica Ojeda at 5/28/2020 11:03 AM     Author: Tamica Ojeda Service: -- Author Type: --    Filed: 5/28/2020 11:05 AM Encounter Date: 5/22/2020 Status: Signed    : Tamica Ojeda       PRIOR AUTHORIZATION DENIED    Denial Rational: must try/fail both pantoprazole and omeprazole.  Insurance is aware patient has already tried/failed omeprazole.            Appeal Information:  This medication was denied. If physician would like to appeal because patient has contraindication or allergy to covered medication please write letter of medical necessity and route back to PA team to initiate.  If no further action is needed please close encounter thank you.

## 2021-06-18 NOTE — PATIENT INSTRUCTIONS - HE
Patient Instructions by Andreea Rodriguez MD at 4/28/2020  2:00 PM     Author: Andreea Rodriguez MD Service: -- Author Type: Physician    Filed: 4/28/2020  2:22 PM Encounter Date: 4/28/2020 Status: Addendum    : Andreea Rodriguez MD (Physician)    Related Notes: Original Note by Andreea Rodriguez MD (Physician) filed at 4/28/2020  2:15 PM       Patient Education     GERD (Adult)    The esophagus is a tube that carries food from the mouth to the stomach. A valve at the lower end of the esophagus prevents stomach acid from flowing upward. When this valve doesn't work properly, stomach contents may repeatedly flow back up (reflux) into the esophagus. This is called gastroesophageal reflux disease (GERD). GERD can irritate the esophagus. It can cause problems with swallowing or breathing. In severe cases, GERD can cause recurrent pneumonia or other serious problems.  Symptoms of reflux include burning, pressure or sharp pain in the upper abdomen or mid to lower chest. The pain can spread to the neck, back, or shoulder. There may be belching, an acid taste in the back of the throat, chronic cough, or sore throat or hoarseness. GERD symptoms often occur during the day after a big meal. They can also occur at night when lying down.   Home care  Lifestyle changes can help reduce symptoms. If needed, medicines may be prescribed. Symptoms often improve with treatment, but if treatment is stopped, the symptoms often return after a few months. So most persons with GERD will need to continue treatment.  Lifestyle changes    Limit or avoid fatty, fried, and spicy foods, as well as coffee, chocolate, mint, and foods with high acid content such as tomatoes and citrus fruit and juices (orange, grapefruit, lemon).    Dont eat large meals, especially at night. Frequent, smaller meals are best. Do not lie down right after eating. And dont eat anything 3 hours before going to bed.    Avoid drinking alcohol and smoking. As  "much as possible, stay away from second hand smoke.    If you are overweight, losing weight will reduce symptoms.     Avoid wearing tight clothing around your stomach area.    If your symptoms occur during sleep, use a foam wedge to elevate your upper body (not just your head.) Or, place 4\" blocks under the head of your bed.  Medicines  If needed, medicines can help relieve the symptoms of GERD and prevent damage to the esophagus. Discuss a medicine plan with your healthcare provider. This may include one or more of the following medicines:    Antacids to help neutralize the normal acids in your stomach.    Acid blockers (H2 blockers) to decrease acid production.    Acid inhibitors (PPIs) to decrease acid production in a different way than the blockers. They may work better, but can take a little longer to take effect.  Take an antacid 30-60 minutes after eating and at bedtime, but not at the same time as an acid blocker.  Try not to take medicines such as ibuprofen and aspirin. If you are taking aspirin for your heart or other medical reasons, talk to your healthcare provider about stopping it.  Follow-up care  Follow up with your healthcare provider or as advised by our staff.  When to seek medical advice  Call your healthcare provider if any of the following occur:    Stomach pain gets worse or moves to the lower right abdomen (appendix area)    Chest pain appears or gets worse, or spreads to the back, neck, shoulder, or arm    Frequent vomiting (cant keep down liquids)    Blood in the stool or vomit (red or black in color)    Feeling weak or dizzy    Fever of 100.4 F (38 C) or higher, or as directed by your healthcare provider  Date Last Reviewed: 6/23/2015 2000-2017 [employer]. [address] [place] [address], [place] [address]. All rights reserved. This information is not intended as a substitute for professional medical care. Always follow your healthcare professional's instructions.           Return " in about 2 months (around 6/28/2020) for gerd if sx persist.

## 2021-06-20 NOTE — LETTER
Letter by Darline Torres RN at      Author: Darline Torres RN Service: -- Author Type: --    Filed:  Encounter Date: 7/16/2020 Status: (Other)       7/16/2020        Cristofer TRISTEN Del Cid  [address] [facility] [address]    This letter provides a written record that you were tested for COVID-19 on 7/12/2020.     Your result was negative. This means that we didnt find the virus that causes COVID-19 in your sample. A test may show negative when you do actually have the virus. This can happen when the virus is in the early stages of infection, before you feel illness symptoms.    If you have symptoms   Stay home and away from others (self-isolate) until you meet ALL of the guidelines below:    Youve had no fever--and no medicine that reduces fever--for 3 full days (72 hours). And ?    Your other symptoms have gotten better. For example, your cough or breathing has improved. And?    At least 10 days have passed since your symptoms started.    During this time:    Stay home. Dont go to work, school or anywhere else.     Stay in your own room, including for meals. Use your own bathroom if you can.    Stay away from others in your home. No hugging, kissing or shaking hands. No visitors.    Clean high touch surfaces often (doorknobs, counters, handles, etc.). Use a household cleaning spray or wipes. You can find a full list on the EPA website at www.epa.gov/pesticide-registration/list-n-disinfectants-use-against-sars-cov-2.    Cover your mouth and nose with a mask, tissue or washcloth to avoid spreading germs.    Wash your hands and face often with soap and water.    Going back to work  Check with your employer for any guidelines to follow for going back to work.    Employers: This document serves as formal notice that your employee tested negative for COVID-19, as of the testing date shown above.

## 2021-06-20 NOTE — LETTER
Letter by Janey Ibrahim RN at      Author: Janey Ibrahim RN Service: -- Author Type: --    Filed:  Encounter Date: 7/12/2020 Status: (Other)       7/12/2020        Cristofer Del Cid  [address] [facility] [address]    This letter provides a written record that you were tested for COVID-19 on 7/7/20.     Your result was negative. This means that we didnt find the virus that causes COVID-19 in your sample. A test may show negative when you do actually have the virus. This can happen when the virus is in the early stages of infection, before you feel illness symptoms.    If you have symptoms   Stay home and away from others (self-isolate) until you meet ALL of the guidelines below:    Youve had no fever--and no medicine that reduces fever--for 3 full days (72 hours). And ?    Your other symptoms have gotten better. For example, your cough or breathing has improved. And?    At least 10 days have passed since your symptoms started.    During this time:    Stay home. Dont go to work, school or anywhere else.     Stay in your own room, including for meals. Use your own bathroom if you can.    Stay away from others in your home. No hugging, kissing or shaking hands. No visitors.    Clean high touch surfaces often (doorknobs, counters, handles, etc.). Use a household cleaning spray or wipes. You can find a full list on the EPA website at www.epa.gov/pesticide-registration/list-n-disinfectants-use-against-sars-cov-2.    Cover your mouth and nose with a mask, tissue or washcloth to avoid spreading germs.    Wash your hands and face often with soap and water.    Going back to work  Check with your employer for any guidelines to follow for going back to work.    Employers: This document serves as formal notice that your employee tested negative for COVID-19, as of the testing date shown above.

## 2021-06-26 NOTE — PROGRESS NOTES
Cristofer Del Cid is a 46 y.o. male who is being evaluated via a billable video visit.      How would you like to obtain your AVS? MyChart.  If dropped from the video visit, the video invitation should be resent by: Text to cell phone: [phone]  Will anyone else be joining your video visit? No      Video Start Time: 10:51    Assessment & Plan     Gastroesophageal reflux disease without esophagitis  Healthy 46-year-old gentleman with gastroesophageal reflux.  Will restart Prilosec.  Is advised to take it first thing in the morning.  He will continue with his healthy diet.  He will let us know if he has any persistent or increasing symptoms  - omeprazole (PRILOSEC) 20 MG capsule  Dispense: 90 capsule; Refill: 1      Return for Annual physical.    Janey Ge MD  [facility]   Cristofer Del Cid is 46 y.o. and presents today for the following health issues   HPI   46-year-old woman with known history of reflux.  He states that he had taken omeprazole in the past and it seemed to improve his symptoms.  He also then changed his diet and lost a lot of weight.  He did get up off the omeprazole as a result.  Over the last several months, and seems to be recurring.  States that happens throughout the day.  He is kept his diet the same and has not had significant changes in his weight.  He like to restart the omeprazole.  He has tried and over-the-counter Pepcid and Tums but they have not been able to completely control his symptoms.  He is otherwise healthy.        Objective    Vitals - Patient Reported  Weight (Patient Reported): 221 lb (100.2 kg)  Vitals:  No vitals were obtained today due to virtual visit.    Physical Exam  Alert and well appearing        Video-Visit Details    Type of service:  Video Visit    Video End Time (time video stopped): 11:00 AM  Originating Location (pt. Location): Home    Distant Location (provider location):  [facility]  used for Video Visit: [place]

## 2021-07-03 NOTE — ADDENDUM NOTE
Addendum Note by Chepe Rodriguez MD at 6/15/2020  6:22 AM     Author: Chepe Rodriguez MD Service: -- Author Type: Physician    Filed: 6/15/2020  6:22 AM Encounter Date: 5/22/2020 Status: Signed    : Chepe Rodriguez MD (Physician)    Addended by: CHEPE RODRIGUEZ on: 6/15/2020 06:22 AM        Modules accepted: Orders

## 2021-07-06 ASSESSMENT — PATIENT HEALTH QUESTIONNAIRE - PHQ9: SUM OF ALL RESPONSES TO PHQ QUESTIONS 1-9: 1

## 2021-07-29 ENCOUNTER — VIRTUAL VISIT (OUTPATIENT)
Dept: FAMILY MEDICINE | Facility: CLINIC | Age: 46
End: 2021-07-29
Payer: COMMERCIAL

## 2021-07-29 DIAGNOSIS — R07.0 THROAT DISCOMFORT: ICD-10-CM

## 2021-07-29 DIAGNOSIS — K21.9 GASTROESOPHAGEAL REFLUX DISEASE WITHOUT ESOPHAGITIS: Primary | ICD-10-CM

## 2021-07-29 PROCEDURE — 99213 OFFICE O/P EST LOW 20 MIN: CPT | Mod: TEL | Performed by: FAMILY MEDICINE

## 2021-07-29 RX ORDER — OMEPRAZOLE 40 MG/1
40 CAPSULE, DELAYED RELEASE ORAL DAILY
Qty: 90 CAPSULE | Refills: 1 | Status: SHIPPED | OUTPATIENT
Start: 2021-07-29 | End: 2021-11-02

## 2021-07-29 NOTE — PROGRESS NOTES
"Cristofer is a 46 year old who is being evaluated via a billable telephone visit.      What phone number would you like to be contacted at? [phone]  How would you like to obtain your AVS? MyChart    Assessment & Plan     Gastroesophageal reflux disease without esophagitis  Has been off his PPI  - omeprazole (PRILOSEC) 40 MG DR capsule  Dispense: 90 capsule; Refill: 1    Throat discomfort  Vague and intermittent sxs, possibly   related to GERD    :[id number]}  PLAN:   Resume  omeprzole 20 mg daily    Stay well hydrated    Use saline (Ocean) spray 2 sprays four times daily.    If continuing to have this abnormal feeling then consider an ENT referral.     No follow-ups on file.    Oscar Wilson MD  [facility]    Subjective   Cristofer is a 46 year old who presents for the following health issues   HPI   \"my throat seems like there is something in there when I swallow\"  For the past 5 days.  Was thinking it was dirt or something.  After drinking a bunch of water does seem better.  No difficulty swallowing liquids or food.  No cough.  When the throat is real it is somewhat painful.  The left nostril always clogs up when outside.  Using an otc nasal spray.     Has been off his omeprazole and the heartburn is back.               Objective           Vitals:  No vitals were obtained today due to virtual visit.    Physical Exam   No exam            Phone call duration: 7 minutes and 45 seconds  "

## 2021-07-29 NOTE — PATIENT INSTRUCTIONS
Resume  omeprzole 20 mg daily    Stay well hydrated    Use saline (Ocean) spray 2 sprays four times daily.    If continuing to have this abnormal feeling then consider an ENT referral.

## 2021-09-04 ENCOUNTER — E-VISIT (OUTPATIENT)
Dept: URGENT CARE | Facility: URGENT CARE | Age: 46
End: 2021-09-04
Payer: COMMERCIAL

## 2021-09-04 DIAGNOSIS — Z20.822 SUSPECTED COVID-19 VIRUS INFECTION: Primary | ICD-10-CM

## 2021-09-04 PROCEDURE — 99421 OL DIG E/M SVC 5-10 MIN: CPT | Performed by: PHYSICIAN ASSISTANT

## 2021-09-05 ENCOUNTER — LAB (OUTPATIENT)
Dept: FAMILY MEDICINE | Facility: CLINIC | Age: 46
End: 2021-09-05
Payer: COMMERCIAL

## 2021-09-05 DIAGNOSIS — Z20.822 SUSPECTED COVID-19 VIRUS INFECTION: ICD-10-CM

## 2021-09-05 PROCEDURE — U0005 INFEC AGEN DETEC AMPLI PROBE: HCPCS

## 2021-09-05 PROCEDURE — U0003 INFECTIOUS AGENT DETECTION BY NUCLEIC ACID (DNA OR RNA); SEVERE ACUTE RESPIRATORY SYNDROME CORONAVIRUS 2 (SARS-COV-2) (CORONAVIRUS DISEASE [COVID-19]), AMPLIFIED PROBE TECHNIQUE, MAKING USE OF HIGH THROUGHPUT TECHNOLOGIES AS DESCRIBED BY CMS-2020-01-R: HCPCS

## 2021-09-05 NOTE — PATIENT INSTRUCTIONS
Dear Cristofer Del Cid,    Your symptoms show that you may have coronavirus (COVID-19). This illness can cause fever, cough and trouble breathing. Many people get a mild case and get better on their own. Some people can get very sick.    Will I be tested for COVID-19?  We would like to test you for Covid-19 virus. I have placed orders for this test.     To schedule: go to your [employer] home page and scroll down to the section that says  You have an appointment that needs to be scheduled  and click the large green button that says  Schedule Now  and follow the steps to find the next available openings.    If you are unable to complete these [employer] scheduling steps, please call [phone] to schedule your testing.     Return to work/school/ guidance:  Please let your workplace manager and staffing office know when your quarantine ends     We can t give you an exact date as it depends on the above. You can calculate this on your own or work with your manager/staffing office to calculate this. (For example if you were exposed on 10/4, you would have to quarantine for 14 full days. That would be through 10/18. You could return on 10/19.)      If you receive a positive COVID-19 test result, follow the guidance of the those who are giving you the results. Usually the return to work is 10 (or in some cases 20 days from symptom onset.) If you work at [facility], you must also be cleared by Employee Occupational Health and Safety to return to work.        If you receive a negative COVID-19 test result and did not have a high risk exposure to someone with a known positive COVID-19 test, you can return to work once you're free of fever for 24 hours without fever-reducing medication and your symptoms are improving or resolved.      If you receive a negative COVID-19 test and If you had a high risk exposure to someone who has tested positive for COVID-19 then you can return to work 14 days after your last contact  with the positive individual    Note: If you have ongoing exposure to the covid positive person, this quarantine period may be more than 14 days. (For example, if you are continued to be exposed to your child who tested positive and cannot isolate from them, then the quarantine of 7-14 days can't start until your child is no longer contagious. This is typically 10 days from onset of the child's symptoms. So the total duration may be 17-24 days in this case.)    Sign up for [employer].   We know it's scary to hear that you might have COVID-19. We want to track your symptoms to make sure you're okay over the next 2 weeks. Please look for an email from [employer]--this is a free, online program that we'll use to keep in touch. To sign up, follow the link in the email you will receive. Learn more at [email or web address]    How can I take care of myself?    Get lots of rest. Drink extra fluids (unless a doctor has told you not to)    Take Tylenol (acetaminophen) or ibuprofen for fever or pain. If you have liver or kidney problems, ask your family doctor if it's okay to take Tylenol o ibuprofen    If you have other health problems (like cancer, heart failure, an organ transplant or severe kidney disease): Call your specialty clinic if you don't feel better in the next 2 days.    Know when to call 911. Emergency warning signs include:  o Trouble breathing or shortness of breath  o Pain or pressure in the chest that doesn't go away  o Feeling confused like you haven't felt before, or not being able to wake up  o Bluish-colored lips or face    Where can I get more information?  [facility] - About COVID-19:   [email or web address]    CDC - What to Do If You're Sick:   www.cdc.gov/coronavirus/2019-ncov/about/steps-when-sick.html    September 4, 2021  RE:  Cristofer Del Cid                                                                                                                  [address] [place] [address]      To whom it may concern:    I evaluated Cristofer Del Cid on September 4, 2021. Cristofer Del Cid should be excused from work/school.     They should let their workplace manager and staffing office know when their quarantine ends.    We can not give an exact date as it depends on the information below. They can calculate this on their own or work with their manager/staffing office to calculate this. (For example if they were exposed on 10/04, they would have to quarantine for 14 full days. That would be through 10/18. They could return on 10/19.)    Quarantine Guidelines:      If patient receives a positive COVID-19 test result, they should follow the guidance of those who are giving the results. Usually the return to work is 10 (or in some cases 20 days from symptom onset.) If they work at [employer], they must be cleared by Employee Occupational Health and Safety to return to work.        If patient receives a negative COVID-19 test result and did not have a high risk exposure to someone with a known positive COVID-19 test, they can return to work once they're free of fever for 24 hours without fever-reducing medication and their symptoms are improving or resolved.      If patient receives a negative COVID-19 test and if they had a high risk exposure to someone who has tested positive for COVID-19 then they can return to work 14 days after their last contact with the positive individual    Note: If there is ongoing exposure to the covid positive person, this quarantine period may be longer than 14 days. (For example, if they are continually exposed to their child, who tested positive and cannot isolate from them, then the quarantine of 7-14 days can't start until their child is no longer contagious. This is typically 10 days from onset to the child's symptoms. So the total duration may be 17-24 days in this case.)     Sincerely,  Kwesi Bustos PA-C

## 2021-09-06 LAB — SARS-COV-2 RNA RESP QL NAA+PROBE: NEGATIVE

## 2021-09-19 ENCOUNTER — HEALTH MAINTENANCE LETTER (OUTPATIENT)
Age: 46
End: 2021-09-19

## 2021-10-31 ENCOUNTER — TRANSFERRED RECORDS (OUTPATIENT)
Dept: HEALTH INFORMATION MANAGEMENT | Facility: CLINIC | Age: 46
End: 2021-10-31
Payer: COMMERCIAL

## 2021-11-01 ENCOUNTER — HOSPITAL ENCOUNTER (EMERGENCY)
Facility: HOSPITAL | Age: 46
Discharge: HOME | End: 2021-11-02

## 2021-11-02 ENCOUNTER — OFFICE VISIT (OUTPATIENT)
Dept: FAMILY MEDICINE | Facility: CLINIC | Age: 46
End: 2021-11-02
Payer: OTHER MISCELLANEOUS

## 2021-11-02 ENCOUNTER — NURSE TRIAGE (OUTPATIENT)
Dept: NURSING | Facility: CLINIC | Age: 46
End: 2021-11-02

## 2021-11-02 VITALS
BODY MASS INDEX: 29.8 KG/M2 | HEART RATE: 71 BPM | DIASTOLIC BLOOD PRESSURE: 76 MMHG | SYSTOLIC BLOOD PRESSURE: 114 MMHG | TEMPERATURE: 98.9 F | WEIGHT: 220 LBS | OXYGEN SATURATION: 96 % | RESPIRATION RATE: 16 BRPM | HEIGHT: 72 IN

## 2021-11-02 VITALS
WEIGHT: 223.06 LBS | DIASTOLIC BLOOD PRESSURE: 74 MMHG | BODY MASS INDEX: 30.25 KG/M2 | HEART RATE: 78 BPM | OXYGEN SATURATION: 99 % | SYSTOLIC BLOOD PRESSURE: 112 MMHG

## 2021-11-02 DIAGNOSIS — M54.16 LUMBAR RADICULOPATHY: Primary | ICD-10-CM

## 2021-11-02 DIAGNOSIS — D23.5: Primary | ICD-10-CM

## 2021-11-02 DIAGNOSIS — D23.5: ICD-10-CM

## 2021-11-02 PROCEDURE — 99214 OFFICE O/P EST MOD 30 MIN: CPT | Performed by: FAMILY MEDICINE

## 2021-11-02 RX ORDER — METHYLPREDNISOLONE 4 MG
TABLET, DOSE PACK ORAL
COMMUNITY
Start: 2021-10-31 | End: 2021-11-22

## 2021-11-02 RX ORDER — CYCLOBENZAPRINE HCL 5 MG
TABLET ORAL
COMMUNITY
Start: 2021-10-31 | End: 2021-11-22 | Stop reason: DRUGHIGH

## 2021-11-02 ASSESSMENT — MIFFLIN-ST. JEOR: SCORE: 1925.91

## 2021-11-02 NOTE — TELEPHONE ENCOUNTER
Patient calling reporting he was lifting a book bin at work yesterday when he back started hurting. Reports going into the orthopedic walk in clinic, being given medication with xray taken. Advised to rest. Patient reports last night not being able to get off the floor for 3 hours. Went to the ER but left prior to being seen. Reports it's painful to sit down with a shooting pain through the entire back when walking. Patient reporting some numbness and warmth to the right foot. Denies weakness, bleeding and wound.  Advised UC/ ER or to office with PCP approval with patient scheduled in office today.     Kelley Tran RN 11/02/21 12:11 PM   [facility] Triage Nurse Advisor        Reason for Disposition    Numbness of a leg or foot (i.e.., loss of sensation)    Additional Information    Negative: Dangerous mechanism of injury (e.g., MVA, contact sports, trampoline, diving, fall > 10 feet or 3 meters) (Exception: back pain began > 1 hour after injury)    Negative: Weakness (i.e., paralysis, loss of muscle strength) of the leg(s) or foot and sudden onset after back injury    Negative: Numbness (i.e., loss of sensation) of the leg(s) or foot and sudden onset after back injury    Negative: Major bleeding (actively dripping or spurting) that can't be stopped    Negative: Bullet, knife or other serious penetrating wound    Negative: Shock suspected (e.g., cold/pale/clammy skin, too weak to stand, low BP, rapid pulse)    Negative: Sounds like a life-threatening emergency to the triager    Negative: Back pain not from an injury    Negative: Back pain from overuse (work, exercise, gardening) OR from twisting, lifting, or bending injury    Negative: SEVERE pain in kidney area (flank) that follows a direct blow to that site    Negative: Blood in urine (red, pink, or tea-colored)    Negative: Unable to urinate (or only a few drops) > 4 hours and bladder feels very full (e.g., palpable bladder or strong urge to urinate)    Negative:  Loss of bladder or bowel control (urine or bowel incontinence; wetting self, leaking stool) of new onset    Negative: Numbness (loss of sensation) in groin or rectal area    Negative: Skin is split open or gaping (length > 1/2 inch or 12 mm)    Negative: Puncture wound of back    Negative: Bleeding won't stop after 10 minutes of direct pressure (using correct technique)    Negative: Sounds like a serious injury to the triager    Negative: Weakness of a leg or foot (e.g., unable to bear weight, dragging foot)    Protocols used: BACK INJURY-A-OH

## 2021-11-02 NOTE — ED TRIAGE NOTES
Pt reports that he lifted a box of books at work yesterday and started having mid back pain. Pt was seen at [facility] and X-ray was done. Pt was given prescriptions for Prednisone and a muscle relaxer. Pt reports worsening pain today.

## 2021-11-02 NOTE — PROGRESS NOTES
ASSESSMENT & PLAN    No problem-specific Assessment & Plan notes found for this encounter.      Cristofer was seen today for back pain.    Diagnoses and all orders for this visit:    Lumbar radiculopathy  -     MR Lumbar Spine w/o Contrast; Future  -     acetaminophen-codeine (TYLENOL #3) 300-30 MG tablet; Take 1-2 tablets by mouth every 8 hours as needed for severe pain  -     acetaminophen-codeine (TYLENOL #3) 300-30 MG tablet; Take 1-2 tablets by mouth every 8 hours as needed for severe pain        There are no Patient Instructions on file for this visit.    No follow-ups on file.       [unfilled]    CHIEF COMPLAINT: Cristofer Del Cid had concerns including Back Pain (low back pain x3days, worst with movement, patient rates pain 5/10 when sitting).    Samish: 1.............. SUBJECTIVE:  Cristofer Del Cid is a 46 year old male had concerns including Back Pain (low back pain x3days, worst with movement, patient rates pain 5/10 when sitting).    1. Lumbar radiculopathy      Cristofer comes in work-related injury  He was at work on [employer]  Lifting a book been in his cell  Something gave out  Did not hear an audible pop  Could not move  Went to Ortho quick on 1031  Received Flexeril and a Medrol Dosepak    Worsening pain    Neck she went to the emergency room stat for 3 hours left after his back started to loosen up    He is having some numbness in his right foot  Prior fusion of the right SI joint  No bowel or bladder incontinence no saddle anesthesia    No new problems past hip pain piriformis syndrome    Usually sees Dr. Mgagi Ndiaye    Allergies to gabapentin      Allergies   Allergen Reactions     Gabapentin Other (See Comments)     Drowsiness                         SOCIAL: He  reports that he has quit smoking. He smoked 0.00 packs per day. He has quit using smokeless tobacco. He reports previous alcohol use. He reports that he does not use drugs.    REVIEW OF SYSTEMS:   Family history not pertinent to chief complaint or  presenting problem    Review of systems otherwise negative as requested from patient, except   Those positive ROS outlined and discussed in [place].      VITALS:  Vitals:    11/02/21 1436   BP: 112/74   Pulse: 78   SpO2: 99%   Weight: 101.2 kg (223 lb 1 oz)     Wt Readings from Last 3 Encounters:   11/02/21 101.2 kg (223 lb 1 oz)   04/01/21 106.4 kg (234 lb 9.6 oz)   06/11/20 113.4 kg (250 lb)     Body mass index is 30.25 kg/m .    Physical Exam:  Flexion-extension of the ankle 5/5 strength  Flexion of the toe on the right 5/5  Flexion extension of the knee 4+ out of 5 on the right  5/5 on the left  Midline scar back  Multiple compound nevi several darkly this colored       I spent 20 minutes with this patient.  This includes pre-visit, intra-visit and post visit work an evaluation with regards to Cristofer was seen today for back pain.    Diagnoses and all orders for this visit:    Lumbar radiculopathy  -     MR Lumbar Spine w/o Contrast; Future  -     acetaminophen-codeine (TYLENOL #3) 300-30 MG tablet; Take 1-2 tablets by mouth every 8 hours as needed for severe pain  -     acetaminophen-codeine (TYLENOL #3) 300-30 MG tablet; Take 1-2 tablets by mouth every 8 hours as needed for severe pain        Kenny Limon MD  Family Medicine   [facility] [address] [phone]

## 2021-11-02 NOTE — PATIENT INSTRUCTIONS
Thanks for coming in today Cristofer    I think you have to worry about possible herniated disc  I would like you to have an MRI of your back  Follow-up with your primary care provider Maggi Morales    Use the Medrol Dosepak as directed  After this is finished you could then use Aleve over-the-counter 2 twice a day with food for total of 10 days    Use the cyclobenzaprine muscle relaxant 2 at bedtime  This is additive with codeine for sedation    Codeine can be used 1 to 2 tablets every 8 hours as needed    Worsening symptoms bowel or bladder incontinence saddle anesthesia or numbness in the bottom area this is a follow-up of the emergency room        See a dermatologist as you have multiple dysplastic looking nevi on your back        I have given you off work through 11/9

## 2021-11-03 ENCOUNTER — MYC MEDICAL ADVICE (OUTPATIENT)
Dept: FAMILY MEDICINE | Facility: CLINIC | Age: 46
End: 2021-11-03

## 2021-11-03 DIAGNOSIS — M54.16 LUMBAR RADICULOPATHY: Primary | ICD-10-CM

## 2021-11-04 ENCOUNTER — HOSPITAL ENCOUNTER (OUTPATIENT)
Dept: MRI IMAGING | Facility: CLINIC | Age: 46
Discharge: HOME | End: 2021-11-04
Attending: FAMILY MEDICINE | Admitting: FAMILY MEDICINE
Payer: OTHER MISCELLANEOUS

## 2021-11-04 DIAGNOSIS — M54.16 LUMBAR RADICULOPATHY: ICD-10-CM

## 2021-11-04 PROCEDURE — 72148 MRI LUMBAR SPINE W/O DYE: CPT

## 2021-11-04 RX ORDER — HYDROCODONE BITARTRATE AND ACETAMINOPHEN 5; 325 MG/1; MG/1
1 TABLET ORAL EVERY 6 HOURS PRN
Qty: 10 TABLET | Refills: 0 | Status: SHIPPED | OUTPATIENT
Start: 2021-11-04 | End: 2021-11-07

## 2021-11-05 ENCOUNTER — TELEPHONE (OUTPATIENT)
Dept: FAMILY MEDICINE | Facility: CLINIC | Age: 46
End: 2021-11-05

## 2021-11-05 DIAGNOSIS — M54.16 LUMBAR RADICULOPATHY: ICD-10-CM

## 2021-11-05 DIAGNOSIS — M43.16 SPONDYLOLISTHESIS OF LUMBAR REGION: Primary | ICD-10-CM

## 2021-11-05 NOTE — TELEPHONE ENCOUNTER
Reason for Call:  Request for results:    Name of test or procedure: MR Lumbar Spine w/o Contrast       Date of test of procedure: 11/4/2021     Location of the test or procedure: [facility]    OK to leave the result message on voice mail or with a family member? YES    Phone number Patient can be reached at: home [phone]      Additional comments: pt is looking for his results.    Call taken on 11/5/2021 at 10:49 AM by Pam J. Behr

## 2021-11-11 ENCOUNTER — OFFICE VISIT (OUTPATIENT)
Dept: PHYSICAL MEDICINE AND REHAB | Facility: CLINIC | Age: 46
End: 2021-11-11
Payer: OTHER MISCELLANEOUS

## 2021-11-11 VITALS
WEIGHT: 219 LBS | HEART RATE: 84 BPM | BODY MASS INDEX: 29.7 KG/M2 | DIASTOLIC BLOOD PRESSURE: 73 MMHG | SYSTOLIC BLOOD PRESSURE: 122 MMHG

## 2021-11-11 DIAGNOSIS — M51.46 SCHMORL'S NODES OF LUMBAR REGION: ICD-10-CM

## 2021-11-11 DIAGNOSIS — M51.9 ANNULAR TEAR: ICD-10-CM

## 2021-11-11 DIAGNOSIS — M54.50 LUMBAR SPINE PAIN: Primary | ICD-10-CM

## 2021-11-11 DIAGNOSIS — M79.18 MYOFASCIAL PAIN: ICD-10-CM

## 2021-11-11 PROCEDURE — 99204 OFFICE O/P NEW MOD 45 MIN: CPT | Performed by: PHYSICAL MEDICINE & REHABILITATION

## 2021-11-11 RX ORDER — CYCLOBENZAPRINE HCL 10 MG
5-10 TABLET ORAL 2 TIMES DAILY PRN
Qty: 60 TABLET | Refills: 1 | Status: SHIPPED | OUTPATIENT
Start: 2021-11-11 | End: 2022-04-07

## 2021-11-11 RX ORDER — NABUMETONE 500 MG/1
500 TABLET, FILM COATED ORAL 2 TIMES DAILY PRN
Qty: 60 TABLET | Refills: 1 | Status: SHIPPED | OUTPATIENT
Start: 2021-11-11 | End: 2021-12-27 | Stop reason: ALTCHOICE

## 2021-11-11 ASSESSMENT — PAIN SCALES - GENERAL: PAINLEVEL: SEVERE PAIN (7)

## 2021-11-11 NOTE — LETTER
11/11/2021         RE: Cristofer Del Cid  [address] [place] [address]        Dear Colleague,    Thank you for referring your patient, Cristofer Del Cid, to the [facility] [place]. Please see a copy of my visit note below.    Assessment/Plan:      Cristofer was seen today for back pain.    Diagnoses and all orders for this visit:    Lumbar spine pain  -     acetaminophen-codeine (TYLENOL #3) 300-30 MG tablet; Take 1 tablet by mouth 2 times daily as needed for severe pain  -     nabumetone (RELAFEN) 500 MG tablet; Take 1 tablet (500 mg) by mouth 2 times daily as needed for moderate pain  -     Physical Therapy Referral; Future    Schmorl's nodes of lumbar region  -     acetaminophen-codeine (TYLENOL #3) 300-30 MG tablet; Take 1 tablet by mouth 2 times daily as needed for severe pain  -     nabumetone (RELAFEN) 500 MG tablet; Take 1 tablet (500 mg) by mouth 2 times daily as needed for moderate pain  -     Physical Therapy Referral; Future    Annular tear  -     Physical Therapy Referral; Future    Myofascial pain  -     cyclobenzaprine (FLEXERIL) 10 MG tablet; Take 0.5-1 tablets (5-10 mg) by mouth 2 times daily as needed for muscle spasms  -     Physical Therapy Referral; Future    Other orders  -     Spine Referral         Assessment: Pleasant 46 year old male With a history of gastroesophageal reflux controlled by diet and status post right sacroiliac joint fusion with:    1. 1 week history of severe low back pain after lifting a box of books at work.  He has  a Schmorl's node in the superior endplate of L5 that appears annular tear at L5-S1 both of which could be causing his severe  acute and potential acute pain.  He also has superimposed myofascial pain in the lumbar spine.  These findings are superimposed on degenerative disc disease throughout the lumbar spine.      Discussion:    1.  I discussed the diagnosis and treatment options.  We discussed the options of conservative management,  "physical therapy, medications, injections.  He would like to continue conservative management as it is only been 1 week.    2.  Trial nabumetone 500 mg twice daily as needed for pain.    3.  Trial cyclobenzaprine 5 to 10 mg twice daily as needed for myofascial pain.    4.  Start physical therapy for lumbar strengthening stabilization and range of motion.    5.  He is in severe pain today.  We will provide him Tylenol 3, 10 tablets 1 tablet twice a day as needed for severe pain.   checked and appropriate.    6.  We will provide some gentle lumbar stretches for him today.    7.  I will provide him light duty at work of no lifting bending or twisting and ability to move around/change positions as needed valid for 1 week until follow-up if he is unable to tolerate sitting at work or doing light duty he will contact us and we can modify his activities at least for  Next week due to the severity of his pain.  He works as a .    8.  Follow-up 1 week.  Can consider lumbar epidural in the future or osteopathic manipulation      It was our pleasure caring for your patient today, if there any questions or concerns please do not hesitate to contact us.      Subjective:   Patient ID: Cristofer Del Cid is a 46 year old male.    History of Present Illness: Patient presents at the request of Dr. Limon for evaluation of low back pain.  This is acute low back pain occurred 1 week ago.  He was at work.  He works as a .  He was lifting a box of books and felt some pain in his back.  No pop was felt.  He was seen at [facility] orthopedics given cyclobenzaprine and Medrol Dosepak and the next day his back \"froze\".  He then went to the emergency department but was unable to be seen due to the long wait.  He then saw his primary care provider the next day.  MRI was done was given Tylenol 3 and referred here eventually.    His pain is at the lumbosacral junction across the thoracolumbar junction he has " some numbness and tingling in his left foot top and bottom of the foot but the back pain is severe 10/10 at worst 7/10 today 4/10 at best.  Worse with any prolonged sitting better with leaning forward or  laying with his knees elevated and heat.  Walking is also helpful.  Has not had any bowel or bladder incontinence but had some constipation.  No weakness's focally in the legs but does have a sense of weakness due to the pain in his back.  Has not had any physical therapy.  Medrol Dosepak was mildly helpful.  Tylenol 3 was helpful at night along with cyclobenzaprine.  He works as a .       Imaging: MRI report and images were personally reviewed and discussed with the patient.  A plastic model was utilized during the discussion.  MRI of the lumbar spine was personally reviewed.  Degenerative disc disease throughout the lower 3 lumbar levels.  Status post right SI fusion.  L5-S1 disc height loss with mild retrolisthesis and facet arthropathy mild to moderate bilaterally my review.  Set arthropathy L4-5 as well.  Superior aspect of L5 endplateReveals a prominent degenerative Schmorl's node.  There is some marrow edema involved in that Schmorl's node.  There is also trace degenerative marrow edema superior aspect of L3.  Hemangioma in the vertebral body of L3.  Central disc bulge L5-S1 with annular tear/high intensity zone.         Review of Systems: Patient complains of sexual dysfunction, constipation, insomnia.  Denies fevers, weight loss, weight gain, headache, change in vision, chest pain, shortness of breath, abdominal pain, nausea, vomiting, bowel or bladder incontinence, skin issues, balance issues. Remainder of 12 point review systems negative unless listed above.    Past Medical History:   Diagnosis Date     GERD (gastroesophageal reflux disease)        Family History   Family history unknown: Yes         Social History     Socioeconomic History     Marital status:      Spouse name:  None     Number of children: 2     Years of education: None     Highest education level: None   Occupational History     None   Tobacco Use     Smoking status: Former Smoker     Packs/day: 0.00     Smokeless tobacco: Former User     Tobacco comment: quit 10 yrs ago   Substance and Sexual Activity     Alcohol use: Not Currently     Drug use: Never     Sexual activity: None   Other Topics Concern     Parent/sibling w/ CABG, MI or angioplasty before 65F 55M? Not Asked   Social History Narrative     None     Social Determinants of Health     Financial Resource Strain: Not on file   Food Insecurity: Not on file   Transportation Needs: Not on file   Physical Activity: Not on file   Stress: Not on file   Social Connections: Not on file   Intimate Partner Violence: Not on file   Housing Stability: Not on file     Social history: .  2 children aged 10 and 14 are boys.  No tobacco.  Does drink alcohol occasionally.  He was adopted.      The following portions of the patient's history were reviewed and updated as appropriate: allergies, current medications, past family history, past medical history, past social history, past surgical history and problem list.    Oswestry (BRENDA) Questionnaire    OSWESTRY DISABILITY INDEX 11/11/2021   Count 9   Sum 24   Oswestry Score (%) 53.33   Some recent data might be hidden       Neck Disability Index:  No flowsheet data found.      WHO 5: 12            Objective:   Physical Exam:    /73 (BP Location: Right arm, Patient Position: Sitting, Cuff Size: Adult Large)   Pulse 84   Wt 219 lb (99.3 kg)   BMI 29.70 kg/m    Body mass index is 29.7 kg/m .    General:  Well-appearing male in no acute distress.  Pleasant, cooperative, and interactive throughout the examination and interview.  CV: No lower extremity edema on inspection or paltation.  Lymphatics: No cervical lymphadenopathy palpated. Eyes: sclera clear. Skin: No rashes or lesions seen over the head/neck, hairline, arms,  legs or lumbar spine.  Respirations unlabored.  MSK: Gait is cautious, mildly flexed at the waist..  Able to heel-toe stand without difficulty.  Negative Romberg.  Spine: normal AP curves of the C, T, and L spine.  Dramatically reduced range of motion lumbar spine in flexion as well as extension due to guarding..  Palpation:   mild tenderness over the lumbar paraspinals e throughout the lumbar spine.  No tenderness over the gluteal tissues.  EXtremities: Full range of motion of the elbows, and wrists with no effusions or tenderness to palpation.   Full range of motion of the hips, knees, and ankles with no effusions or tenderness to palpation.   . No hypermobility of the upper or lower extremities.  Neurologic exam: Mental status: Patient is alert and oriented with normal affect.  Attention, knowledge, memory, and language are intact.  Normal coordination throughout the examination.  Reflexes are 2+ and symmetric biceps, triceps, brachioradialis, patellar, and Achilles with down-going toes and Negative Corbin's.  Sensation is intact to light touch throughout the upper and lower extremities bilaterally.  Manual muscle testing reveals 5 out of 5 in the hip flexors, knee flexors/extensors, ankle plantar flexors, ankle  dorsiflexors, and EHL.  Upper extremities: Grossly normal strength . Normal muscle bulk and tone in the arms and legs.    Negative seated  straight leg raise bilaterally.            Again, thank you for allowing me to participate in the care of your patient.        Sincerely,        Roberto Mcmillan DO

## 2021-11-11 NOTE — PROGRESS NOTES
Assessment/Plan:      Cristofer was seen today for back pain.    Diagnoses and all orders for this visit:    Lumbar spine pain  -     acetaminophen-codeine (TYLENOL #3) 300-30 MG tablet; Take 1 tablet by mouth 2 times daily as needed for severe pain  -     nabumetone (RELAFEN) 500 MG tablet; Take 1 tablet (500 mg) by mouth 2 times daily as needed for moderate pain  -     Physical Therapy Referral; Future    Schmorl's nodes of lumbar region  -     acetaminophen-codeine (TYLENOL #3) 300-30 MG tablet; Take 1 tablet by mouth 2 times daily as needed for severe pain  -     nabumetone (RELAFEN) 500 MG tablet; Take 1 tablet (500 mg) by mouth 2 times daily as needed for moderate pain  -     Physical Therapy Referral; Future    Annular tear  -     Physical Therapy Referral; Future    Myofascial pain  -     cyclobenzaprine (FLEXERIL) 10 MG tablet; Take 0.5-1 tablets (5-10 mg) by mouth 2 times daily as needed for muscle spasms  -     Physical Therapy Referral; Future    Other orders  -     Spine Referral         Assessment: Pleasant 46 year old male With a history of gastroesophageal reflux controlled by diet and status post right sacroiliac joint fusion with:    1. 1 week history of severe low back pain after lifting a box of books at work.  He has  a Schmorl's node in the superior endplate of L5 that appears annular tear at L5-S1 both of which could be causing his severe  acute and potential acute pain.  He also has superimposed myofascial pain in the lumbar spine.  These findings are superimposed on degenerative disc disease throughout the lumbar spine.      Discussion:    1.  I discussed the diagnosis and treatment options.  We discussed the options of conservative management, physical therapy, medications, injections.  He would like to continue conservative management as it is only been 1 week.    2.  Trial nabumetone 500 mg twice daily as needed for pain.    3.  Trial cyclobenzaprine 5 to 10 mg twice daily as needed for  "myofascial pain.    4.  Start physical therapy for lumbar strengthening stabilization and range of motion.    5.  He is in severe pain today.  We will provide him Tylenol 3, 10 tablets 1 tablet twice a day as needed for severe pain.   checked and appropriate.    6.  We will provide some gentle lumbar stretches for him today.    7.  I will provide him light duty at work of no lifting bending or twisting and ability to move around/change positions as needed valid for 1 week until follow-up if he is unable to tolerate sitting at work or doing light duty he will contact us and we can modify his activities at least for  Next week due to the severity of his pain.  He works as a .    8.  Follow-up 1 week.  Can consider lumbar epidural in the future or osteopathic manipulation      It was our pleasure caring for your patient today, if there any questions or concerns please do not hesitate to contact us.      Subjective:   Patient ID: Cristofer Del Cid is a 46 year old male.    History of Present Illness: Patient presents at the request of Dr. Limon for evaluation of low back pain.  This is acute low back pain occurred 1 week ago.  He was at work.  He works as a .  He was lifting a box of books and felt some pain in his back.  No pop was felt.  He was seen at [facility] orthopedics given cyclobenzaprine and Medrol Dosepak and the next day his back \"froze\".  He then went to the emergency department but was unable to be seen due to the long wait.  He then saw his primary care provider the next day.  MRI was done was given Tylenol 3 and referred here eventually.    His pain is at the lumbosacral junction across the thoracolumbar junction he has some numbness and tingling in his left foot top and bottom of the foot but the back pain is severe 10/10 at worst 7/10 today 4/10 at best.  Worse with any prolonged sitting better with leaning forward or  laying with his knees elevated and heat.  " Walking is also helpful.  Has not had any bowel or bladder incontinence but had some constipation.  No weakness's focally in the legs but does have a sense of weakness due to the pain in his back.  Has not had any physical therapy.  Medrol Dosepak was mildly helpful.  Tylenol 3 was helpful at night along with cyclobenzaprine.  He works as a .       Imaging: MRI report and images were personally reviewed and discussed with the patient.  A plastic model was utilized during the discussion.  MRI of the lumbar spine was personally reviewed.  Degenerative disc disease throughout the lower 3 lumbar levels.  Status post right SI fusion.  L5-S1 disc height loss with mild retrolisthesis and facet arthropathy mild to moderate bilaterally my review.  Set arthropathy L4-5 as well.  Superior aspect of L5 endplateReveals a prominent degenerative Schmorl's node.  There is some marrow edema involved in that Schmorl's node.  There is also trace degenerative marrow edema superior aspect of L3.  Hemangioma in the vertebral body of L3.  Central disc bulge L5-S1 with annular tear/high intensity zone.         Review of Systems: Patient complains of sexual dysfunction, constipation, insomnia.  Denies fevers, weight loss, weight gain, headache, change in vision, chest pain, shortness of breath, abdominal pain, nausea, vomiting, bowel or bladder incontinence, skin issues, balance issues. Remainder of 12 point review systems negative unless listed above.    Past Medical History:   Diagnosis Date     GERD (gastroesophageal reflux disease)        Family History   Family history unknown: Yes         Social History     Socioeconomic History     Marital status:      Spouse name: None     Number of children: 2     Years of education: None     Highest education level: None   Occupational History     None   Tobacco Use     Smoking status: Former Smoker     Packs/day: 0.00     Smokeless tobacco: Former User     Tobacco  comment: quit 10 yrs ago   Substance and Sexual Activity     Alcohol use: Not Currently     Drug use: Never     Sexual activity: None   Other Topics Concern     Parent/sibling w/ CABG, MI or angioplasty before 65F 55M? Not Asked   Social History Narrative     None     Social Determinants of Health     Financial Resource Strain: Not on file   Food Insecurity: Not on file   Transportation Needs: Not on file   Physical Activity: Not on file   Stress: Not on file   Social Connections: Not on file   Intimate Partner Violence: Not on file   Housing Stability: Not on file     Social history: .  2 children aged 10 and 14 are boys.  No tobacco.  Does drink alcohol occasionally.  He was adopted.      The following portions of the patient's history were reviewed and updated as appropriate: allergies, current medications, past family history, past medical history, past social history, past surgical history and problem list.    Oswestry (BRENDA) Questionnaire    OSWESTRY DISABILITY INDEX 11/11/2021   Count 9   Sum 24   Oswestry Score (%) 53.33   Some recent data might be hidden       Neck Disability Index:  No flowsheet data found.      WHO 5: 12            Objective:   Physical Exam:    /73 (BP Location: Right arm, Patient Position: Sitting, Cuff Size: Adult Large)   Pulse 84   Wt 219 lb (99.3 kg)   BMI 29.70 kg/m    Body mass index is 29.7 kg/m .    General:  Well-appearing male in no acute distress.  Pleasant, cooperative, and interactive throughout the examination and interview.  CV: No lower extremity edema on inspection or paltation.  Lymphatics: No cervical lymphadenopathy palpated. Eyes: sclera clear. Skin: No rashes or lesions seen over the head/neck, hairline, arms, legs or lumbar spine.  Respirations unlabored.  MSK: Gait is cautious, mildly flexed at the waist..  Able to heel-toe stand without difficulty.  Negative Romberg.  Spine: normal AP curves of the C, T, and L spine.  Dramatically reduced range of  motion lumbar spine in flexion as well as extension due to guarding..  Palpation:   mild tenderness over the lumbar paraspinals e throughout the lumbar spine.  No tenderness over the gluteal tissues.  EXtremities: Full range of motion of the elbows, and wrists with no effusions or tenderness to palpation.   Full range of motion of the hips, knees, and ankles with no effusions or tenderness to palpation.   . No hypermobility of the upper or lower extremities.  Neurologic exam: Mental status: Patient is alert and oriented with normal affect.  Attention, knowledge, memory, and language are intact.  Normal coordination throughout the examination.  Reflexes are 2+ and symmetric biceps, triceps, brachioradialis, patellar, and Achilles with down-going toes and Negative Corbin's.  Sensation is intact to light touch throughout the upper and lower extremities bilaterally.  Manual muscle testing reveals 5 out of 5 in the hip flexors, knee flexors/extensors, ankle plantar flexors, ankle  dorsiflexors, and EHL.  Upper extremities: Grossly normal strength . Normal muscle bulk and tone in the arms and legs.    Negative seated  straight leg raise bilaterally.

## 2021-11-11 NOTE — PATIENT INSTRUCTIONS
1. A physical therapy order was provided for you today.  You  will be contacted by physical therapy.  If nobody contacts you within 3 to 5 days, please contact the clinic at [phone].  It will be very important for you to do your physical therapy exercises on a regular basis to decrease your pain and prevent future pain flares.    2. Nabumetone (which is an anti-inflammatory) medication is prescribed today. Take 1 tablet  2 times a day as needed for pain. This medication should be taken with food and water to prevent any stomach upset. Do not take ibuprofen/Advil/Motrin/Aleve/naproxen while you take Nabumetone. Please call if you have any side effects.    3. Tylenol #3 was prescribed for you today Please lock this medication up when you are not taking it. Do not share this medication with other people. Do not increase the dose without permission from your physician. Do not drink alcohol while you take this medication as this can lead to death. Do not take other pain medications without approval from your physician or this can also lead to death. If you need a refill of this medication, you must come in to clinic by appointment. Please call if you have any questions on how to take this medication.    4. Cyclobenzaprine (muscle relaxant medication) has been prescribed today. Please take 5-10 mg twice daily as needed. This medication may cause drowsiness. Please do not work or drive while taking this medication until you know how it effects you. If it does make you drowsy, you should only take it before bedtime or at times that you do not have to work/drive.    5. Light duty at work until follow up 1 weeks      6. Lumbar handout

## 2021-11-11 NOTE — LETTER
November 11, 2021      Cristofer Del Cid  [address] [place] [address]        To Whom It May Concern:    Cristofer J Maria Eugenia was seen in our clinic. He may return to work with the following: limited to light duty - No lifting , bending, twisting.  Ability to change positions as needed.  Valid for 1 week unil follow up.       Sincerely,        Roberto Mcmillan, DO

## 2021-11-14 NOTE — TELEPHONE ENCOUNTER
Cristofer has followed through with [facility] [facility]    Next steps  Continue to follow through for ongoing care for his lumbar spine with our spine care specialist    Devin

## 2021-11-15 ENCOUNTER — TELEPHONE (OUTPATIENT)
Dept: PHYSICAL MEDICINE AND REHAB | Facility: CLINIC | Age: 46
End: 2021-11-15
Payer: COMMERCIAL

## 2021-11-15 NOTE — TELEPHONE ENCOUNTER
Patient called back. He was asked by  that we give him a call with more information to proposed treatment. 's name is El Batista. Phone number is [phone].

## 2021-11-15 NOTE — TELEPHONE ENCOUNTER
Phone call to patient's Work comp , [employer]. Explained the request from [employer] for approval for 3 sessions of OMT prior to patient starting PT on 12/8/21. He gave verbal approval for these treatments.     He did ask that any order for this or documentation of request be faxed to [phone].    Claim #[id number]; Date of injury 10/31/21

## 2021-11-15 NOTE — TELEPHONE ENCOUNTER
There is no formal order in the system to order osteopathic manipulation.  A letter has been placed in the chart requesting this.

## 2021-11-15 NOTE — TELEPHONE ENCOUNTER
"PSP:  Roberto Mcmillan, DO   Last clinic visit:  11/11/21  Reason for call: \"He has me going back to work. I can't go back to work like this.\"  Clinical information:  Reports he is taking the Nabumetone and Cyclobenzaprine as prescribed last week. \"I can't go to work on the Tylenol #3s. It seems like when I sit or lay down for too long, my back locks up again. I have to get down on the floor to work it out. I am spending a lot of time on the floor.\" Taking Tylenol #3 only at HS. Reports unable to be scheduled for PT until 12/8/21. Would like letter for work. Can be sent through [employer].   Advice given to patient: [employer] will be notified of status and request  Provider to address: status update and letter for work   "

## 2021-11-15 NOTE — LETTER
To whom it may concern,    Due to the patient's medical condition/low back pain from the work-related injury, I recommend osteopathic manipulation 5-6 body regions x3 over the next 3 weeks.    Sincerely,    Roberto Mcmillan D.O.        
November 15, 2021      Cristofer J Maria Eugenia  [address] [place] [address]        To Whom It May Concern:    Cristofer J Maria Eugenia  was seen on November 11, 2021. It is my medical opinion that he should remain out of work until his follow up in my clinic on November 18, 2021.        Sincerely,        Roberto Mcmillan, DO    
8

## 2021-11-15 NOTE — TELEPHONE ENCOUNTER
Phone call to patient to discuss [employer]'s response. Explained [employer] will keep him off of work through his follow up appointment of 11/18/21. Further work restrictions will be discussed at that time.     He is asked to get approval for 3 visits for osteopathic manipulation to 5-6 body regions. He will work on this.     Explained letter has not yet been written, but once completed it will be sent to him in [facility]. Stated understanding and appreciation for call back.     Letter has been composed. Please review, sign and send to patient.

## 2021-11-15 NOTE — TELEPHONE ENCOUNTER
"I will keep him off of work until his appointment with me on November 18.  We can discuss further work restrictions at that point.      I can also consider osteopathic manipulation at that appointment but this is work comp and will need to be approved.  Please have him see if we can get 3 visits of osteopathic manipulation (5-6 body regions) approved to be completed prior to his physical therapy visit.    I am having a difficult time finding communications under this encounter.  Please provide a letter for me to sign stating    \" It is my medical opinion that the patient should remain out of work until follow-up in my clinic on November 18, 2021\"  "

## 2021-11-17 ENCOUNTER — HOSPITAL ENCOUNTER (OUTPATIENT)
Dept: PHYSICAL THERAPY | Facility: REHABILITATION | Age: 46
End: 2021-11-17
Payer: OTHER MISCELLANEOUS

## 2021-11-17 DIAGNOSIS — M62.81 WEAKNESS OF TRUNK MUSCULATURE: Primary | ICD-10-CM

## 2021-11-17 PROCEDURE — 97535 SELF CARE MNGMENT TRAINING: CPT | Mod: GP | Performed by: PHYSICAL THERAPIST

## 2021-11-17 PROCEDURE — 97110 THERAPEUTIC EXERCISES: CPT | Mod: GP | Performed by: PHYSICAL THERAPIST

## 2021-11-17 PROCEDURE — 97140 MANUAL THERAPY 1/> REGIONS: CPT | Mod: GP | Performed by: PHYSICAL THERAPIST

## 2021-11-17 PROCEDURE — 97161 PT EVAL LOW COMPLEX 20 MIN: CPT | Mod: GP | Performed by: PHYSICAL THERAPIST

## 2021-11-17 NOTE — PROGRESS NOTES
11/17/21 1500   General Information   Type of Visit Initial OP Ortho PT Evaluation   Start of Care Date 11/17/21   Referring Physician Roberto Mcmillan, DO    Patient/Family Goals Statement Reduce pain to be able to work again    Orders Evaluate and Treat   Date of Order 11/11/21   Certification Required? No   Medical Diagnosis Lumbar spine pain    Surgical/Medical history reviewed Yes   Precautions/Limitations other (see comments)  (Light duty restrictions currently )   Body Part(s)   Body Part(s) Lumbar Spine/SI   Presentation and Etiology   Pertinent history of current problem (include personal factors and/or comorbidities that impact the POC) Pt reports a work injury while lifting a box of books at work from the floor (unsure how heavy, but likely more than 50 lbs). After the first 1-2 days, he was told to rest and take muscle relaxers. The back then froze up and he couldn't move well - was stuck in place. He tried to be seen at the ED, but the wait was so long and slowly his frozen back resolved. Hurts more to sit than to stand. Has muscle spasms in the low back when sitting too long. Standing or lying on his back is most tolerable. Pt is full-time . He has to be able to lift at least 50 lbs, to get on the ground, wrestle with someone if needed, restrain someone if needed. The job requires a lot of standing and walking and running occasionally, even running up/down stairs. Sitting is minimal at work.  Feels like the right foot is somewhat numb (plantar and dorsal surface) - feels different than the other foot.    Impairments A. Pain;D. Decreased ROM;E. Decreased flexibility;F. Decreased strength and endurance;K. Numbness   Symptom Location central and lateral low back   How/Where did it occur While lifting;At work   Onset date of current episode/exacerbation 10/31/21   Chronicity New   Pain rating (0-10 point scale) Other;Worst (/10)   Worst (/10) 10   Pain rating comment today 4/10    Current / Previous Interventions   Diagnostic Tests: MRI   MRI Results Results   MRI results Mild multilevel degenerative changes of the lumbar spine, with grade 1 retrolisthesis at L5 on S1.  At L3-L4, there is mild central spinal canal stenosis with mild to moderate bilateral subarticular zone stenosis and minimal bilateral neural foraminal stenosis. At L5-S1, there is a small midline focal disc protrusion with associated high intensity zone. No significant central spinal canal or neural foraminal stenosis is present at this level.   Fall Risk Screen   Fall screen completed by PT   Have you fallen 2 or more times in the past year? No   Have you fallen and had an injury in the past year? No   Is patient a fall risk? No   Abuse Screen (yes response referral indicated)   Feels Unsafe at Home or Work/School no   Feels Threatened by Someone no   Does Anyone Try to Keep You From Having Contact with Others or Doing Things Outside Your Home? no   Physical Signs of Abuse Present no   System Outcome Measures   Outcome Measures Low Back Pain (see Oswestry and Anette)  (66%)   Lumbar Spine/SI Objective Findings   Observation Difficulty sitting - standing during most of session    Balance/Proprioception (Single Leg Stance) 5-6 sec on R, 3-4 sec on L with instability noted B and B trendelenburg present    Flexion ROM limited by 50% with increased pain    Extension ROM limited by 25%, no pain    Right Side Bending ROM limited by 50% with pain    Left Side Bending ROM limited by 50% with pain    Hip Screen R hip PROM WNL all directions, mild pulling in the back with flexion, L hip PROM WNL in all directions except IR is limited by 25% and flx caused mild pulling in the back   Transversus Abdominus Strength (Chris Leg Lowering-deg) 3/5 with difficulty maintaining posterior pelvic tilt with double knee lifts   Hip Flexion (L2) Strength 4/5 B with pain    Hip Abduction Strength 5/5 B, no pain   Hip Adduction Strength 5/5 B with  pain    Hip Extension Strength 5/5 on L, 4/5 on R    Knee Flexion Strength 5/5 on R, 4/5 on L with pain    Knee Extension (L3) Strength 5/5 B    Ankle Dorsiflexion (L4) Strength 5/5 B    Great Toe Extension (L5) Strength 5/5 B    Ankle Plantar Flexion (S1) Strength 5/5 B, with pain with L side testing    Lumbar/Hip/Knee/Foot Strength Comments trunk extensor strength: 4/5, no pain   SLR positive L, negative R    Spring Test pain at L4 with spring testing   Lumbar/SI Special Tests Comments CARRIE negative on R, positive on L for restriction   Palpation tender B lumbar paraspinals (worse at spinalis)   Planned Therapy Interventions   Planned Therapy Interventions balance training;bed mobility training;gait training;joint mobilization;manual therapy;motor coordination training;neuromuscular re-education;ROM;strengthening;stretching   Planned Modality Interventions   Planned Modality Interventions Cryotherapy;Hot packs;TENS   Clinical Impression   Criteria for Skilled Therapeutic Interventions Met yes, treatment indicated   PT Diagnosis Trunk weakness   Influenced by the following impairments pain, decreased lumbar and hip ROM and strength   Functional limitations due to impairments difficulty sitting, pain with transitional movements, unable to work    Clinical Presentation Stable/Uncomplicated   Clinical Decision Making (Complexity) Low complexity   Therapy Frequency other (see comments)  (1-2 times/week)   Predicted Duration of Therapy Intervention (days/wks) 16 visits   Risk & Benefits of therapy have been explained Yes   Patient, Family & other staff in agreement with plan of care Yes   Clinical Impression Comments Pt is a 46 year-old man with chief complaint low back pain after work injury on 10/31/21.  MRI indicates L5 on S1 retrolisthesis and L5-S1 midline disc protrusion/annular tear. He demonstrates decreased lumbar ROM (with directional preference for extension at this time) without significant radicular  symptoms, though left side is weaker into PF and pt had a positive SLR d/t pain. He is unable to work as a  at this time d/t inability to run, lift or sit without significant pain. He is appropriate for skilled PT to address impairments, instruct in HEP to reduce pain, improve function and allow for return to work without restrictions.    ORTHO GOALS   PT Ortho Eval Goals 1;2;3   Ortho Goal 1   Goal Identifier Work Tasks   Goal Description Pt will report ability to tolerate a 10-hour work shift, including all tasks necessary (standing, walking, sitting, running, etc.) without LBP.    Target Date 01/26/22   Ortho Goal 2   Goal Identifier Lifting   Goal Description Pt will demonstrate ability to lift (with correct body mechanics) 25-50 lbs from the floor to waist and waist to waist level to demonstrate ability to perform normal work duties without back pain.    Target Date 01/26/22   Ortho Goal 3   Goal Identifier Sitting   Goal Description Pt will report ability to tolerate sitting 2 hours to be able to tolerate a shift of light duty work.    Target Date 01/26/22   Total Evaluation Time   PT Eval, Low Complexity Minutes (85267) 18     Joan Melgoza, PT, DPT, CLT  11/17/2021

## 2021-11-18 ENCOUNTER — MYC MEDICAL ADVICE (OUTPATIENT)
Dept: PHYSICAL MEDICINE AND REHAB | Facility: CLINIC | Age: 46
End: 2021-11-18

## 2021-11-18 NOTE — TELEPHONE ENCOUNTER
Please contact the patient and let him know that unfortunately he missed his appointment today and I am unable to write any further work restrictions until his follow-up.  He can get on a wait list for tomorrow and if I have an opening he can come to clinic.

## 2021-11-18 NOTE — TELEPHONE ENCOUNTER
Phone call to patient to discuss [employer]'s response to [employer] message. Patient again did ask about a letter for light duty. Explained that until he is seen in clinic [employer] could not provide any further work restrictions. He would like to be put on cancellation/wait list for [employer]'s schedule tomorrow. Scheduling notified.

## 2021-11-22 ENCOUNTER — HOSPITAL ENCOUNTER (OUTPATIENT)
Dept: PHYSICAL THERAPY | Facility: REHABILITATION | Age: 46
End: 2021-11-22
Payer: OTHER MISCELLANEOUS

## 2021-11-22 ENCOUNTER — OFFICE VISIT (OUTPATIENT)
Dept: PHYSICAL MEDICINE AND REHAB | Facility: CLINIC | Age: 46
End: 2021-11-22
Payer: OTHER MISCELLANEOUS

## 2021-11-22 VITALS — DIASTOLIC BLOOD PRESSURE: 80 MMHG | SYSTOLIC BLOOD PRESSURE: 147 MMHG | HEART RATE: 97 BPM | OXYGEN SATURATION: 98 %

## 2021-11-22 DIAGNOSIS — M51.46 SCHMORL'S NODES OF LUMBAR REGION: ICD-10-CM

## 2021-11-22 DIAGNOSIS — M51.9 ANNULAR TEAR: ICD-10-CM

## 2021-11-22 DIAGNOSIS — M54.50 LUMBAR SPINE PAIN: Primary | ICD-10-CM

## 2021-11-22 DIAGNOSIS — M79.18 MYOFASCIAL PAIN: ICD-10-CM

## 2021-11-22 DIAGNOSIS — M62.81 WEAKNESS OF TRUNK MUSCULATURE: Primary | ICD-10-CM

## 2021-11-22 PROCEDURE — 97140 MANUAL THERAPY 1/> REGIONS: CPT | Mod: GP | Performed by: PHYSICAL THERAPIST

## 2021-11-22 PROCEDURE — 99214 OFFICE O/P EST MOD 30 MIN: CPT | Performed by: PHYSICAL MEDICINE & REHABILITATION

## 2021-11-22 PROCEDURE — 97110 THERAPEUTIC EXERCISES: CPT | Mod: GP | Performed by: PHYSICAL THERAPIST

## 2021-11-22 RX ORDER — MELOXICAM 15 MG/1
7.5-15 TABLET ORAL DAILY PRN
Qty: 30 TABLET | Refills: 1 | Status: SHIPPED | OUTPATIENT
Start: 2021-11-22 | End: 2022-02-21

## 2021-11-22 ASSESSMENT — PAIN SCALES - GENERAL: PAINLEVEL: SEVERE PAIN (6)

## 2021-11-22 NOTE — PATIENT INSTRUCTIONS
1. Meloxicam (which is an anti-inflammatory) medication is prescribed today. Take 1/2-1 daily as needed for pain. This medication should be taken with food and water to prevent any stomach upset. Do not take ibuprofen/Advil/Motrin/Aleve/naproxen while you take Meloxicam. Please call if you have any side effects.    2. Tylenol #3 was prescribed for you today Please lock this medication up when you are not taking it. Do not share this medication with other people. Do not increase the dose without permission from your physician. Do not drink alcohol while you take this medication as this can lead to death. Do not take other pain medications without approval from your physician or this can also lead to death. If you need a refill of this medication, you must come in to clinic by appointment. Please call if you have any questions on how to take this medication.    3. Continue with physical therapy    4. Work restrictions provided    5. Contact our  to see if we can still do manipulation as you are now in physical therapy.

## 2021-11-22 NOTE — LETTER
11/22/2021         RE: Cristofer Del Cid  [address] [facility]   [place] [facility] [id number]        Dear Colleague,    Thank you for referring your patient, Cristofer Del Cid, to the [facility] [place]. Please see a copy of my visit note below.    Assessment/Plan:      Cristofer was seen today for back pain and medication refill.    Diagnoses and all orders for this visit:    Lumbar spine pain  -     meloxicam (MOBIC) 15 MG tablet; Take 0.5-1 tablets (7.5-15 mg) by mouth daily as needed for pain  -     acetaminophen-codeine (TYLENOL #3) 300-30 MG tablet; Take 1 tablet by mouth 2 times daily as needed for severe pain    Annular tear  -     meloxicam (MOBIC) 15 MG tablet; Take 0.5-1 tablets (7.5-15 mg) by mouth daily as needed for pain  -     acetaminophen-codeine (TYLENOL #3) 300-30 MG tablet; Take 1 tablet by mouth 2 times daily as needed for severe pain    Schmorl's nodes of lumbar region  -     meloxicam (MOBIC) 15 MG tablet; Take 0.5-1 tablets (7.5-15 mg) by mouth daily as needed for pain    Myofascial pain  -     meloxicam (MOBIC) 15 MG tablet; Take 0.5-1 tablets (7.5-15 mg) by mouth daily as needed for pain         Assessment: Pleasant 46 year old male With a history of gastroesophageal reflux controlled by diet and status post right sacroiliac joint fusion with:     1.  Persistent 2 to 3-week history of severe low back pain after lifting a box of books at work on or around November 2.  He has  a Schmorl's node in the superior endplate of L5 with mild edema as well as a disc bulge and annular tear at L5-S1 both of which could be causing his severe  acute   pain.  He also has superimposed myofascial pain in the lumbar spine.  These findings are superimposed on degenerative disc disease throughout the lumbar spine.  Has had some mild improvement over the past couple of weeks has been into a couple visits of physical therapy and using medications.     Discussion:    1.  We discussed the diagnosis and  treatment options again today.  Given the severity of pain we discussed options of continuing therapy, along with lumbar injections medication changes.  Osteopathic manipulation has been approved however he is now in physical therapy need to clarify if we can continue with the plan of OMT.  He is wanting to continue with conservative management without injection    2.  Trial meloxicam in place of ibuprofen as nabumetone cause GI upset.    3.  Continue physical therapy.    4.  He is out of Tylenol 3 and this does allow him to get some sleep.  We will provide him 14 more tablets which can be taken twice a day as needed for severe pain typically only taking 1 at bedtime.   checked and appropriate.    5.  We will provide work restrictions of 10 pound lift limit no bending twisting or lifting.  4-hour shifts.  Please allow him to change position as needed for comfort.  This will be valid for 2 weeks until follow-up.    6.  He should contact his  and if he is able to have OMT can return for that treatment.    7.  Follow-up 2 weeks.      It was our pleasure caring for your patient today, if there any questions or concerns please do not hesitate to contact us.      Subjective:   Patient ID: Cristofer Del Cid is a 46 year old male.    History of Present Illness: Presents for follow-up of lumbar spine pain.  Has had some improvement but still laying on the bed on my entering the room.  Pain is across the lumbar spine to the lumbosacral junction.  Feels tightness in his back.  Felt a pop in the mid to upper back this weekend and since then his pain has been improved.  He is not having any pain down his legs and his pain is a 9/10 or 6/10 today 4/10 at best.  Worse with sitting better with changing positions or laying in his back.  Has been to 2 visits of physical therapy and is having increased pain today as he just had therapy.  Better with heat.  Taking cyclobenzaprine at night along with Tylenol 3 at night.   Nabumetone caused GI upset and is now taking ibuprofen only.  Maybe 30% improved.  Is out of Tylenol 3.  Had not been working last week was given time off work.  Missed his appointment last week.  Did not go to work yesterday due to his pain.  Is wanting a refill of Tylenol 3.  Had been approved for OMT but now we got him into physical therapy.      Imaging: Lumbar MRI images reviewed again today for medical decision-making purposes.  Multilevel degenerative changes with grade 1 spondylolisthesis/retrolisthesis of L5 on S1.  L3-4 mild central stenosis and moderate lateral recess stenosis at L5-S1 central disc bulge/small herniation with high intensity zone/annular tear.  Facet arthropathy L4-5 is mild with degenerative changes the L4-5 disc endplate irregularity consistent with Schmorl's node does not appear to be acute on STIR imaging although there is slight increased signal of the endplate.    Review of Systems: Pertinent positives:  complains of weakness in his back has had some urinary leakage at times after he is finished urinating.  Pertinent negatives: No numbness or tingling.  No bowel or bladder incontinence.  No urinary retention.  No fevers, unintentional weight loss, balance changes, headaches, frequent falling, difficulty swallowing, or coordination difficulties.  All others reviewed are negative.           Past Medical History:   Diagnosis Date     GERD (gastroesophageal reflux disease)        The following portions of the patient's history were reviewed and updated as appropriate: allergies, current medications, past family history, past medical history, past social history, past surgical history and problem list.           Objective:   Physical Exam:    BP (!) 147/80 (BP Location: Right arm, Patient Position: Sitting)   Pulse 97   SpO2 98%   There is no height or weight on file to calculate BMI.      General: Alert and oriented with normal affect.  Laying in his back when I enter the room.   Attention, knowledge, memory, and language are intact. No acute distress.   Eyes: Sclerae are clear.  Respirations: Unlabored. CV: No lower extremity edema.  Skin: No rashes seen.    Gait:  Nonantalgic, cautious.  Sensation is intact to light touch throughout the upper and lower extremities.  Reflexes are   2+ patellar and 1+ right, 2+ left Achilles      Manual muscle testing reveals:  Right /Left out of 5     5/5 hip flexors  5/5 knee flexors  5/5 knee extensors  5/5 ankle plantar flexors  5/5 ankle dorsiflexors  5/5  EHL        Again, thank you for allowing me to participate in the care of your patient.        Sincerely,        Roberto Mcmillan, DO

## 2021-11-22 NOTE — LETTER
November 22, 2021      Cristofer Del Cid  [address] [facility] [address]        To Whom It May Concern:    Cristofer J Maria Eugenia was seen in our clinic. He may return to work with the following: limited to light duty - lifting no greater than 10 pounds with no lift/bend/twist. 4 hour shifts. Allow to change positions as needed for comfort.      Sincerely,        Roberto cMmillan, DO

## 2021-11-22 NOTE — PROGRESS NOTES
Assessment/Plan:      Cristofer was seen today for back pain and medication refill.    Diagnoses and all orders for this visit:    Lumbar spine pain  -     meloxicam (MOBIC) 15 MG tablet; Take 0.5-1 tablets (7.5-15 mg) by mouth daily as needed for pain  -     acetaminophen-codeine (TYLENOL #3) 300-30 MG tablet; Take 1 tablet by mouth 2 times daily as needed for severe pain    Annular tear  -     meloxicam (MOBIC) 15 MG tablet; Take 0.5-1 tablets (7.5-15 mg) by mouth daily as needed for pain  -     acetaminophen-codeine (TYLENOL #3) 300-30 MG tablet; Take 1 tablet by mouth 2 times daily as needed for severe pain    Schmorl's nodes of lumbar region  -     meloxicam (MOBIC) 15 MG tablet; Take 0.5-1 tablets (7.5-15 mg) by mouth daily as needed for pain    Myofascial pain  -     meloxicam (MOBIC) 15 MG tablet; Take 0.5-1 tablets (7.5-15 mg) by mouth daily as needed for pain         Assessment: Pleasant 46 year old male With a history of gastroesophageal reflux controlled by diet and status post right sacroiliac joint fusion with:     1.  Persistent 2 to 3-week history of severe low back pain after lifting a box of books at work on or around November 2.  He has  a Schmorl's node in the superior endplate of L5 with mild edema as well as a disc bulge and annular tear at L5-S1 both of which could be causing his severe  acute   pain.  He also has superimposed myofascial pain in the lumbar spine.  These findings are superimposed on degenerative disc disease throughout the lumbar spine.  Has had some mild improvement over the past couple of weeks has been into a couple visits of physical therapy and using medications.     Discussion:    1.  We discussed the diagnosis and treatment options again today.  Given the severity of pain we discussed options of continuing therapy, along with lumbar injections medication changes.  Osteopathic manipulation has been approved however he is now in physical therapy need to clarify if we can  continue with the plan of OMT.  He is wanting to continue with conservative management without injection    2.  Trial meloxicam in place of ibuprofen as nabumetone cause GI upset.    3.  Continue physical therapy.    4.  He is out of Tylenol 3 and this does allow him to get some sleep.  We will provide him 14 more tablets which can be taken twice a day as needed for severe pain typically only taking 1 at bedtime.   checked and appropriate.    5.  We will provide work restrictions of 10 pound lift limit no bending twisting or lifting.  4-hour shifts.  Please allow him to change position as needed for comfort.  This will be valid for 2 weeks until follow-up.    6.  He should contact his  and if he is able to have OMT can return for that treatment.    7.  Follow-up 2 weeks.      It was our pleasure caring for your patient today, if there any questions or concerns please do not hesitate to contact us.      Subjective:   Patient ID: Cristofer Del Cid is a 46 year old male.    History of Present Illness: Presents for follow-up of lumbar spine pain.  Has had some improvement but still laying on the bed on my entering the room.  Pain is across the lumbar spine to the lumbosacral junction.  Feels tightness in his back.  Felt a pop in the mid to upper back this weekend and since then his pain has been improved.  He is not having any pain down his legs and his pain is a 9/10 or 6/10 today 4/10 at best.  Worse with sitting better with changing positions or laying in his back.  Has been to 2 visits of physical therapy and is having increased pain today as he just had therapy.  Better with heat.  Taking cyclobenzaprine at night along with Tylenol 3 at night.  Nabumetone caused GI upset and is now taking ibuprofen only.  Maybe 30% improved.  Is out of Tylenol 3.  Had not been working last week was given time off work.  Missed his appointment last week.  Did not go to work yesterday due to his pain.  Is wanting a refill of  Tylenol 3.  Had been approved for OMT but now we got him into physical therapy.      Imaging: Lumbar MRI images reviewed again today for medical decision-making purposes.  Multilevel degenerative changes with grade 1 spondylolisthesis/retrolisthesis of L5 on S1.  L3-4 mild central stenosis and moderate lateral recess stenosis at L5-S1 central disc bulge/small herniation with high intensity zone/annular tear.  Facet arthropathy L4-5 is mild with degenerative changes the L4-5 disc endplate irregularity consistent with Schmorl's node does not appear to be acute on STIR imaging although there is slight increased signal of the endplate.    Review of Systems: Pertinent positives:  complains of weakness in his back has had some urinary leakage at times after he is finished urinating.  Pertinent negatives: No numbness or tingling.  No bowel or bladder incontinence.  No urinary retention.  No fevers, unintentional weight loss, balance changes, headaches, frequent falling, difficulty swallowing, or coordination difficulties.  All others reviewed are negative.           Past Medical History:   Diagnosis Date     GERD (gastroesophageal reflux disease)        The following portions of the patient's history were reviewed and updated as appropriate: allergies, current medications, past family history, past medical history, past social history, past surgical history and problem list.           Objective:   Physical Exam:    BP (!) 147/80 (BP Location: Right arm, Patient Position: Sitting)   Pulse 97   SpO2 98%   There is no height or weight on file to calculate BMI.      General: Alert and oriented with normal affect.  Laying in his back when I enter the room.  Attention, knowledge, memory, and language are intact. No acute distress.   Eyes: Sclerae are clear.  Respirations: Unlabored. CV: No lower extremity edema.  Skin: No rashes seen.    Gait:  Nonantalgic, cautious.  Sensation is intact to light touch throughout the upper and  lower extremities.  Reflexes are   2+ patellar and 1+ right, 2+ left Achilles      Manual muscle testing reveals:  Right /Left out of 5     5/5 hip flexors  5/5 knee flexors  5/5 knee extensors  5/5 ankle plantar flexors  5/5 ankle dorsiflexors  5/5  EHL

## 2021-11-24 ENCOUNTER — MYC MEDICAL ADVICE (OUTPATIENT)
Dept: PHYSICAL MEDICINE AND REHAB | Facility: CLINIC | Age: 46
End: 2021-11-24
Payer: COMMERCIAL

## 2021-11-24 NOTE — LETTER
November 26, 2021      Cristofer J Maria Eugenia  [address] [place] [id number]        To Whom It May Concern:    Cirstofer J Maria Eugenia  was seen on 11/22/2021.  Due to ongoing medical issues, he was unable to attend work on November 24, 2021.  He may now return to work with his previously provided work restrictions.        Sincerely,        Roberto Mcmillan, DO

## 2021-11-26 NOTE — TELEPHONE ENCOUNTER
Please contact the patient and inform him that I will provide a letter for him Wednesday, 11/24/2021.  I would like him to try to work 4 hours of the light duty that I provided this weekend and follow-up with me in another 1 to 2 weeks.

## 2021-12-01 ENCOUNTER — HOSPITAL ENCOUNTER (OUTPATIENT)
Dept: PHYSICAL THERAPY | Facility: REHABILITATION | Age: 46
End: 2021-12-01
Payer: OTHER MISCELLANEOUS

## 2021-12-01 DIAGNOSIS — M62.81 WEAKNESS OF TRUNK MUSCULATURE: Primary | ICD-10-CM

## 2021-12-01 PROCEDURE — 97110 THERAPEUTIC EXERCISES: CPT | Mod: GP | Performed by: PHYSICAL THERAPIST

## 2021-12-01 PROCEDURE — 97140 MANUAL THERAPY 1/> REGIONS: CPT | Mod: GP | Performed by: PHYSICAL THERAPIST

## 2021-12-02 NOTE — TELEPHONE ENCOUNTER
Patient returned call. PSP's response reviewed with him. Patient now scheduled for follow up with Roberto Mcmillan DO on 12/10/21.

## 2021-12-02 NOTE — TELEPHONE ENCOUNTER
It is definitely not ideal for him to need to take off several days from work due to the pain flare.    Please  assist him in getting an appointment with me next week.  He can track his days off until that time we can attempt to modify his work restrictions to keep him from having to call him.

## 2021-12-08 ENCOUNTER — HOSPITAL ENCOUNTER (OUTPATIENT)
Dept: PHYSICAL THERAPY | Facility: REHABILITATION | Age: 46
End: 2021-12-08
Attending: PHYSICAL MEDICINE & REHABILITATION
Payer: OTHER MISCELLANEOUS

## 2021-12-08 DIAGNOSIS — M51.46 SCHMORL'S NODES OF LUMBAR REGION: ICD-10-CM

## 2021-12-08 DIAGNOSIS — M79.18 MYOFASCIAL PAIN: ICD-10-CM

## 2021-12-08 DIAGNOSIS — M54.50 LUMBAR SPINE PAIN: ICD-10-CM

## 2021-12-08 DIAGNOSIS — M51.9 ANNULAR TEAR: ICD-10-CM

## 2021-12-08 PROCEDURE — 97140 MANUAL THERAPY 1/> REGIONS: CPT | Mod: GP

## 2021-12-08 PROCEDURE — 97110 THERAPEUTIC EXERCISES: CPT | Mod: GP

## 2021-12-09 NOTE — TELEPHONE ENCOUNTER
Phone call to patient to inform PSP would like to see him in follow up next week. Left message to return call. Patient should track his days that he has had to take off of work. PSP will attempt to modify work restrictions at the follow up.     [employer] message also sent to patient.    Rhomboid Transposition Flap Text: The defect edges were debeveled with a #15 scalpel blade.  Given the location of the defect and the proximity to free margins a rhomboid transposition flap was deemed most appropriate.  Using a sterile surgical marker, an appropriate rhomboid flap was drawn incorporating the defect.    The area thus outlined was incised deep to adipose tissue with a #15 scalpel blade.  The skin margins were undermined to an appropriate distance in all directions utilizing iris scissors.

## 2021-12-10 ENCOUNTER — OFFICE VISIT (OUTPATIENT)
Dept: PHYSICAL MEDICINE AND REHAB | Facility: CLINIC | Age: 46
End: 2021-12-10
Payer: OTHER MISCELLANEOUS

## 2021-12-10 VITALS — DIASTOLIC BLOOD PRESSURE: 73 MMHG | HEART RATE: 76 BPM | SYSTOLIC BLOOD PRESSURE: 118 MMHG

## 2021-12-10 DIAGNOSIS — M51.46 SCHMORL'S NODES OF LUMBAR REGION: ICD-10-CM

## 2021-12-10 DIAGNOSIS — M54.50 LUMBAR SPINE PAIN: Primary | ICD-10-CM

## 2021-12-10 DIAGNOSIS — M51.9 ANNULAR TEAR: ICD-10-CM

## 2021-12-10 DIAGNOSIS — G47.9 SLEEP DIFFICULTIES: ICD-10-CM

## 2021-12-10 DIAGNOSIS — M43.28 FUSION OF SACRAL REGION OF SPINE: ICD-10-CM

## 2021-12-10 DIAGNOSIS — M79.18 MYOFASCIAL PAIN: ICD-10-CM

## 2021-12-10 PROCEDURE — 99214 OFFICE O/P EST MOD 30 MIN: CPT | Performed by: PHYSICAL MEDICINE & REHABILITATION

## 2021-12-10 RX ORDER — HYDROCODONE BITARTRATE AND ACETAMINOPHEN 5; 325 MG/1; MG/1
.5-1 TABLET ORAL 2 TIMES DAILY PRN
Qty: 6 TABLET | Refills: 0 | Status: SHIPPED | OUTPATIENT
Start: 2021-12-10 | End: 2021-12-13

## 2021-12-10 RX ORDER — METHYLPREDNISOLONE 4 MG
TABLET, DOSE PACK ORAL
Qty: 21 TABLET | Refills: 0 | Status: SHIPPED | OUTPATIENT
Start: 2021-12-10 | End: 2021-12-27

## 2021-12-10 ASSESSMENT — PAIN SCALES - GENERAL: PAINLEVEL: MILD PAIN (3)

## 2021-12-10 NOTE — LETTER
December 10, 2021      Cristofer Del Cid  [address] [facility] [address]        To Whom It May Concern:    Cristofer Del Cid was seen in our clinic. Was absent from work on 11/24, 11/28, 11/30, 12/1, 12/6, 12/7 due to medical issues related to his injury.  He may return to work with the following:limited to light duty - lifting no greater than 10 pounds with no lift/bend/twist. 4 hour shifts. 1 day between shifts (No shifts on consecutive days). Allow to change positions as needed for comfort. Valid 2 weeks until follow-up.      Sincerely,        Roberto Mcmillan, DO

## 2021-12-10 NOTE — PROGRESS NOTES
Assessment/Plan:      Cristofer was seen today for back pain.    Diagnoses and all orders for this visit:    Lumbar spine pain  -     methylPREDNISolone (MEDROL DOSEPAK) 4 MG tablet therapy pack; Follow Package Directions  -     XR Pelvis w 2 VW Hips Bilateral; Future    Schmorl's nodes of lumbar region  -     HYDROcodone-acetaminophen (NORCO) 5-325 MG tablet; Take 0.5-1 tablets by mouth 2 times daily as needed for severe pain  -     methylPREDNISolone (MEDROL DOSEPAK) 4 MG tablet therapy pack; Follow Package Directions    Annular tear  -     HYDROcodone-acetaminophen (NORCO) 5-325 MG tablet; Take 0.5-1 tablets by mouth 2 times daily as needed for severe pain  -     methylPREDNISolone (MEDROL DOSEPAK) 4 MG tablet therapy pack; Follow Package Directions    Myofascial pain    Sleep difficulties    Fusion of sacral region of spine  -     XR Pelvis w 2 VW Hips Bilateral; Future         Assessment: Pleasant 46 year old male with a history of gastroesophageal reflux controlled by diet and status post right sacroiliac joint fusion with:     1.   Improved but persistent severe low back pain after lifting a box of books at work on or around November 2.  He has a Schmorl's node in the superior endplate of L5 with mild edema as well as a disc bulge and annular tear at L5-S1 both of which could be causing his severe subacute pain.  He also has superimposed myofascial pain in the lumbar spine.  These findings are superimposed on degenerative disc disease throughout the lumbar spine.  Has made some gains with up to 50% improvement with physical therapy and using medications.  He takes meloxicam and has had some Tylenol 3 at bedtime on days that he works.  He has had severe pain days after he has a shift at work.       Discussion:    1.  We discussed the diagnosis and treatment options again today.  I am not exactly sure the main cause for his severe pain.  He has edema in the superior endplate of L5 with some chronic degenerative  endplate edema although I question if they Schmorl's node has a partial acute component causing the endplate edema there and his pain.  He also has a annular tear at L5-S1.  We discussed options of lumbar epidural, continued activity modifications and medication changes.  He is not interested in an injection at this time and wants to try further medications activity modifications.  He is improving.    2.  Recommend Medrol Dosepak.  He has tried 1 in the past when this first started but he would like to try one more to see if it can offer some mild ongoing benefit.    3.  I will provide 6 tablets of hydrocodone from to take 1/2 to 1 tablet twice a day but he is going to try to use this only at bedtime the nights that he works.    4.  He missed several days of work due to pain in between shifts.  After his work shifts he has severe pain and I did  discussed with him that I would like him to continue to try to go to work and if we can change his work restrictions so he can go every day that he is scheduled that would be optimal.  We will adjust his work restrictions.    5.  We will continue a 10 pound lifting No repetitive lifting bending or twisting.  4-hour shifts.  I will ask for 1 day between shifts for him to recover.  Allow time for him to change position as needed for comfort.  I did mention that he had pain and was absent from work on November 24, 2020 eighth, 30th, December 1, sixth and seventh through 2 flares of pain following his shifts.  These restrictions are valid for 2 weeks until follow-up.    6.  Follow-up in 2 weeks    It was our pleasure caring for your patient today, if there any questions or concerns please do not hesitate to contact us.    Addendum: December 10, 2021: 11:30 AM:    We will also order plain films of the pelvis to evaluate for any abnormality of the right SI fusion.  We will get imaging of the hips as well to evaluate for any abnormality such as osteoarthritis playing a role in his  pain.      Subjective:   Patient ID: Cristofer Del Cid is a 46 year old male.    History of Present Illness: patient presents for follow-up of low back pain after lifting injury at work.  His pain is improved up to 50%.  This is now left greater than right at the lumbosacral junction.  He still gets pain to the iliac crest and occasional out to the anterior groin.  Feels weakness in his back and legs.  Worse with sitting or standing better with changing positions or laying down.  Waxing and waning in intensity 9/10 at worst 3/10 today.  He has been able to go to work 4-hour shifts.  He works for a couple of hours and takes a break and then works for another hour and a half.  Lays on the floor at work for about 40 minutes as he has to drive home for 40 minutes and driving is very painful for him.  His pain is in the left greater than right now without radiation down the legs.  Has been doing physical therapy.  Taking Tylenol 3 on the days that he works at night wants to get away from Tylenol 3 and try different medication.  Taking meloxicam on the days that he works as well.  He does feel that his pain is improving but is still limited.  Had to miss several days from work which were typically days after he worked as his pain significantly flared.  These were November 24, 2020 eighth, 30th, December 1, December 6 and December 7.    He has not been using cyclobenzaprine as it caused some urinary leakage and that resolved.  Did fall on one occasion.      Imaging: I personally reviewed MRI imaging again today for medical decision-making purposes.  Images and report personally reviewed.  Mild disc height loss L4-5 L5-S1 and T2 signal change L3-4.  Hemangioma L3.  No high-grade central stenosis.  Annular tear with high intensity zone of the disc at L5-S1.  There is edema in the superior endplate of L5 likely anterior Schmorl's node likely acute on my review.  Prior right SI fusion no significant facet arthropathy    Review of  Systems: Pertinent positives: Feels weakness in his back.  Pertinent negatives: No numbness or tingling in the legs.  No bowel or bladder incontinence.  No urinary retention.  No fevers, unintentional weight loss, balance changes, headaches, frequent falling, difficulty swallowing, or coordination difficulties.  All others reviewed are negative.         Past Medical History:   Diagnosis Date     GERD (gastroesophageal reflux disease)        The following portions of the patient's history were reviewed and updated as appropriate: allergies, current medications, past family history, past medical history, past social history, past surgical history and problem list.           Objective:   Physical Exam:    /73 (BP Location: Right arm, Patient Position: Sitting, Cuff Size: Adult Regular)   Pulse 76   There is no height or weight on file to calculate BMI.      General: Alert and oriented with normal affect.  Laying on his back when I enter the room.  Attention, knowledge, memory, and language are intact. No acute distress.   Eyes: Sclerae are clear.  Respirations: Unlabored.  .    Gait:  Nonantalgic    Sensation is intact to light touch throughout the   lower extremities.  Reflexes are  2+ patellar and Achilles     Manual muscle testing reveals:     5/5 hip flexors  5/5 knee flexors  5/5 knee extensors  5/5 ankle plantar flexors  5/5 ankle dorsiflexors  5/5   ankle evertors

## 2021-12-10 NOTE — PATIENT INSTRUCTIONS
1. Continue with physical therapy    2. Continue with work restrictions.    3. Hydrocodone/acetaminophen  was prescribed for you today Please lock this medication up when you are not taking it. Do not share this medication with other people. Do not increase the dose without permission from your physician. Do not drink alcohol while you take this medication as this can lead to death. Do not take other pain medications without approval from your physician or this can also lead to death. If you need a refill of this medication, you must come in to clinic by appointment. Please call if you have any questions on how to take this medication.    4. Medrol dose pack has been prescribed today.  Please follow directions on package.  Do not take with NSAIDs (ibupreofen or aleve).  I would recommend tylenol (extra strength or extended release/8hour) over the counter as needed.    5. An Xray was ordered for you today.  You will be contacted by scheduling within 3 days.    If you are not contacted, please call Radiology at [phone].

## 2021-12-10 NOTE — LETTER
12/10/2021         RE: Cristofer Del Cid  [address] [place] [address]        Dear Colleague,    Thank you for referring your patient, Cristofer Del Cid, to the [facility] [place]. Please see a copy of my visit note below.    Assessment/Plan:      Cristofer was seen today for back pain.    Diagnoses and all orders for this visit:    Lumbar spine pain  -     methylPREDNISolone (MEDROL DOSEPAK) 4 MG tablet therapy pack; Follow Package Directions  -     XR Pelvis w 2 VW Hips Bilateral; Future    Schmorl's nodes of lumbar region  -     HYDROcodone-acetaminophen (NORCO) 5-325 MG tablet; Take 0.5-1 tablets by mouth 2 times daily as needed for severe pain  -     methylPREDNISolone (MEDROL DOSEPAK) 4 MG tablet therapy pack; Follow Package Directions    Annular tear  -     HYDROcodone-acetaminophen (NORCO) 5-325 MG tablet; Take 0.5-1 tablets by mouth 2 times daily as needed for severe pain  -     methylPREDNISolone (MEDROL DOSEPAK) 4 MG tablet therapy pack; Follow Package Directions    Myofascial pain    Sleep difficulties    Fusion of sacral region of spine  -     XR Pelvis w 2 VW Hips Bilateral; Future         Assessment: Pleasant 46 year old male with a history of gastroesophageal reflux controlled by diet and status post right sacroiliac joint fusion with:     1.   Improved but persistent severe low back pain after lifting a box of books at work on or around November 2.  He has a Schmorl's node in the superior endplate of L5 with mild edema as well as a disc bulge and annular tear at L5-S1 both of which could be causing his severe subacute pain.  He also has superimposed myofascial pain in the lumbar spine.  These findings are superimposed on degenerative disc disease throughout the lumbar spine.  Has made some gains with up to 50% improvement with physical therapy and using medications.  He takes meloxicam and has had some Tylenol 3 at bedtime on days that he works.  He has had severe  pain days after he has a shift at work.       Discussion:    1.  We discussed the diagnosis and treatment options again today.  I am not exactly sure the main cause for his severe pain.  He has edema in the superior endplate of L5 with some chronic degenerative endplate edema although I question if they Schmorl's node has a partial acute component causing the endplate edema there and his pain.  He also has a annular tear at L5-S1.  We discussed options of lumbar epidural, continued activity modifications and medication changes.  He is not interested in an injection at this time and wants to try further medications activity modifications.  He is improving.    2.  Recommend Medrol Dosepak.  He has tried 1 in the past when this first started but he would like to try one more to see if it can offer some mild ongoing benefit.    3.  I will provide 6 tablets of hydrocodone from to take 1/2 to 1 tablet twice a day but he is going to try to use this only at bedtime the nights that he works.    4.  He missed several days of work due to pain in between shifts.  After his work shifts he has severe pain and I did  discussed with him that I would like him to continue to try to go to work and if we can change his work restrictions so he can go every day that he is scheduled that would be optimal.  We will adjust his work restrictions.    5.  We will continue a 10 pound lifting No repetitive lifting bending or twisting.  4-hour shifts.  I will ask for 1 day between shifts for him to recover.  Allow time for him to change position as needed for comfort.  I did mention that he had pain and was absent from work on November 24, 2020 eighth, 30th, December 1, sixth and seventh through 2 flares of pain following his shifts.  These restrictions are valid for 2 weeks until follow-up.    6.  Follow-up in 2 weeks    It was our pleasure caring for your patient today, if there any questions or concerns please do not hesitate to contact  us.      Subjective:   Patient ID: Cristofer Del Cid is a 46 year old male.    History of Present Illness: patient presents for follow-up of low back pain after lifting injury at work.  His pain is improved up to 50%.  This is now left greater than right at the lumbosacral junction.  He still gets pain to the iliac crest and occasional out to the anterior groin.  Feels weakness in his back and legs.  Worse with sitting or standing better with changing positions or laying down.  Waxing and waning in intensity 9/10 at worst 3/10 today.  He has been able to go to work 4-hour shifts.  He works for a couple of hours and takes a break and then works for another hour and a half.  Lays on the floor at work for about 40 minutes as he has to drive home for 40 minutes and driving is very painful for him.  His pain is in the left greater than right now without radiation down the legs.  Has been doing physical therapy.  Taking Tylenol 3 on the days that he works at night wants to get away from Tylenol 3 and try different medication.  Taking meloxicam on the days that he works as well.  He does feel that his pain is improving but is still limited.  Had to miss several days from work which were typically days after he worked as his pain significantly flared.  These were November 24, 2020 eighth, 30th, December 1, December 6 and December 7.    He has not been using cyclobenzaprine as it caused some urinary leakage and that resolved.  Did fall on one occasion.      Imaging: I personally reviewed MRI imaging again today for medical decision-making purposes.  Images and report personally reviewed.  Mild disc height loss L4-5 L5-S1 and T2 signal change L3-4.  Hemangioma L3.  No high-grade central stenosis.  Annular tear with high intensity zone of the disc at L5-S1.  There is edema in the superior endplate of L5 likely anterior Schmorl's node likely acute on my review.  Prior right SI fusion no significant facet arthropathy    Review of  Systems: Pertinent positives: Feels weakness in his back.  Pertinent negatives: No numbness or tingling in the legs.  No bowel or bladder incontinence.  No urinary retention.  No fevers, unintentional weight loss, balance changes, headaches, frequent falling, difficulty swallowing, or coordination difficulties.  All others reviewed are negative.         Past Medical History:   Diagnosis Date     GERD (gastroesophageal reflux disease)        The following portions of the patient's history were reviewed and updated as appropriate: allergies, current medications, past family history, past medical history, past social history, past surgical history and problem list.           Objective:   Physical Exam:    /73 (BP Location: Right arm, Patient Position: Sitting, Cuff Size: Adult Regular)   Pulse 76   There is no height or weight on file to calculate BMI.      General: Alert and oriented with normal affect.  Laying on his back when I enter the room.  Attention, knowledge, memory, and language are intact. No acute distress.   Eyes: Sclerae are clear.  Respirations: Unlabored.  .    Gait:  Nonantalgic    Sensation is intact to light touch throughout the   lower extremities.  Reflexes are  2+ patellar and Achilles     Manual muscle testing reveals:     5/5 hip flexors  5/5 knee flexors  5/5 knee extensors  5/5 ankle plantar flexors  5/5 ankle dorsiflexors  5/5   ankle evertors      Again, thank you for allowing me to participate in the care of your patient.        Sincerely,        Roberto Mcmillan, DO

## 2021-12-15 ENCOUNTER — HOSPITAL ENCOUNTER (OUTPATIENT)
Dept: RADIOLOGY | Facility: HOSPITAL | Age: 46
Discharge: HOME | End: 2021-12-15
Attending: PHYSICAL MEDICINE & REHABILITATION | Admitting: PHYSICAL MEDICINE & REHABILITATION
Payer: OTHER MISCELLANEOUS

## 2021-12-15 DIAGNOSIS — M54.50 LUMBAR SPINE PAIN: ICD-10-CM

## 2021-12-15 DIAGNOSIS — M43.28 FUSION OF SACRAL REGION OF SPINE: ICD-10-CM

## 2021-12-15 PROCEDURE — 73521 X-RAY EXAM HIPS BI 2 VIEWS: CPT

## 2021-12-21 ENCOUNTER — HOSPITAL ENCOUNTER (OUTPATIENT)
Dept: PHYSICAL THERAPY | Facility: REHABILITATION | Age: 46
End: 2021-12-21
Payer: OTHER MISCELLANEOUS

## 2021-12-21 DIAGNOSIS — M62.81 WEAKNESS OF TRUNK MUSCULATURE: Primary | ICD-10-CM

## 2021-12-21 PROCEDURE — 97110 THERAPEUTIC EXERCISES: CPT | Mod: GP | Performed by: PHYSICAL THERAPIST

## 2021-12-21 PROCEDURE — 97140 MANUAL THERAPY 1/> REGIONS: CPT | Mod: GP | Performed by: PHYSICAL THERAPIST

## 2021-12-27 ENCOUNTER — OFFICE VISIT (OUTPATIENT)
Dept: PHYSICAL MEDICINE AND REHAB | Facility: CLINIC | Age: 46
End: 2021-12-27
Payer: OTHER MISCELLANEOUS

## 2021-12-27 VITALS — OXYGEN SATURATION: 96 % | HEART RATE: 87 BPM | DIASTOLIC BLOOD PRESSURE: 67 MMHG | SYSTOLIC BLOOD PRESSURE: 129 MMHG

## 2021-12-27 DIAGNOSIS — M43.28 FUSION OF SACRAL REGION OF SPINE: ICD-10-CM

## 2021-12-27 DIAGNOSIS — M54.50 LUMBAR SPINE PAIN: Primary | ICD-10-CM

## 2021-12-27 DIAGNOSIS — M51.9 ANNULAR TEAR: ICD-10-CM

## 2021-12-27 DIAGNOSIS — M51.46 SCHMORL'S NODES OF LUMBAR REGION: ICD-10-CM

## 2021-12-27 DIAGNOSIS — G47.9 SLEEP DIFFICULTIES: ICD-10-CM

## 2021-12-27 DIAGNOSIS — M79.18 MYOFASCIAL PAIN: ICD-10-CM

## 2021-12-27 PROCEDURE — 99214 OFFICE O/P EST MOD 30 MIN: CPT | Performed by: PHYSICAL MEDICINE & REHABILITATION

## 2021-12-27 ASSESSMENT — PAIN SCALES - GENERAL: PAINLEVEL: MILD PAIN (3)

## 2021-12-27 NOTE — PROGRESS NOTES
Assessment/Plan:      Cristofer was seen today for back pain, leg pain and letter for school/work.    Diagnoses and all orders for this visit:    Lumbar spine pain  -     Physical Therapy Referral; Future    Schmorl's nodes of lumbar region  -     Physical Therapy Referral; Future    Annular tear  -     Physical Therapy Referral; Future    Myofascial pain  -     Physical Therapy Referral; Future    Fusion of sacral region of spine  -     Physical Therapy Referral; Future    Sleep difficulties         Assessment: Pleasant 46 year old male with a history of gastroesophageal reflux controlled by diet and status post right sacroiliac joint fusion with:     1.  Persistent but improving low back pain after lifting a box of books at work on or around November 2.  He has a Schmorl's node in the superior endplate of L5 with mild edema as well as a disc bulge and annular tear at L5-S1 both of which could be causing his severe subacute pain.  He also has superimposed myofascial pain in the lumbar spine.  These findings are superimposed on degenerative disc disease throughout the lumbar spine.  Has made some gains with up to 70% improvement with physical therapy and using medications.  He takes meloxicam or cyclobenzaprine occasionally.  Had been using some Tylenol 3 or hydrocodone at bedtime on days that he works and no longer is needing this.  Was previously having significant pain and needed to work every other day.  Now feels he can work daily with 4-hour shifts.     2.  Myofascial pain in the left anterior lateral thigh tensor fascia cata.    Discussion:    1.  We discussed the diagnosis again and treatment options.  He is having slow steady progress with Medrol Dosepak along with physical therapy and other medications.  He is up to 70% improved and I feel that we can advance his work restriction but is still having pain in the lumbar spine with too much activity.    2.  Advance physical therapy to a MedX program to see if he  is comfortable on the MedX machine.  This will be for more aggressive lumbar strengthening.    3.  May continue with cyclobenzaprine and meloxicam as needed.    4.  Advance work restrictions to 10 pound lift limit and 4-hour shifts with allowing him to changing position as needed for comfort.    5.Can consider lumbar epidural but he is not interested.    6.  Follow-up with me in 2 weeks to see if we can continue to advance his restrictions.    It was our pleasure caring for your patient today, if there any questions or concerns please do not hesitate to contact us.      Subjective:   Patient ID: Cristofer Del Cid is a 46 year old male.    History of Present Illness: *Patient presents for follow-up of lumbar spine pain.  This is the lumbosacral junction in the thoracic spine.  He is having some left anterior thigh pain now after doing more exercises and physical therapy which occasionally radiates down to the mid thigh.  His back pain is the most significant issue for him worse with sitting or standing for long periods.  Better with walking or lying down.  Taking meloxicam occasionally along with cyclobenzaprine.  Had some hydrocodone but no longer needing that.  The last few shifts at work he has been able to work 4 hours without being sore the next day feels that things are improving up to 70% since the beginning 30% remaining.  Pain is a 9/10 at worst 3/10 today 1/10 at best.  Took Medrol Dosepak after last visit and did very well with the medication.  It was helpful for his pain.      Imaging: I personally reviewed MRI images of the lumbar spine November 4, 2021 along with x-rays of the pelvis.  X-rays of the pelvis reveal no acute abnormality postop changes of the SI joint.  MRI lumbar spine reveals the SI fusion.  He has degenerative changes with mild disc height loss L4-5 L5-S1 with irregularity L4-5 with a Schmorl's node in the superior endplate of L5 with some edema which appears mild.  Central disc bulge with  high intensity zone L5-S1.  There is mild lateral recess stenosis L3-4.  Mild fluid in the facets at L3-4.  No high-grade central stenosis and no high-grade foraminal stenosis.  No lateral recess stenosis.    Review of Systems: Pertinent positives: Weakness in his back.  Pertinent negatives: No numbness or tingling.  No bowel or bladder incontinence.  No urinary retention.  No fevers, unintentional weight loss, balance changes, headaches, frequent falling, difficulty swallowing, or coordination difficulties.  All others reviewed are negative.           Past Medical History:   Diagnosis Date     GERD (gastroesophageal reflux disease)        The following portions of the patient's history were reviewed and updated as appropriate: allergies, current medications, past family history, past medical history, past social history, past surgical history and problem list.           Objective:   Physical Exam:    /67 (BP Location: Right arm, Patient Position: Sitting)   Pulse 87   SpO2 96%   There is no height or weight on file to calculate BMI.      General: Alert and oriented with normal affect. Attention, knowledge, memory, and language are intact. No acute distress.   Eyes: Sclerae are clear.  Respirations: Unlabored. CV: No lower extremity edema.  Skin: No rashes seen.    Gait:  Nonantalgic  Tenderness over the left tensor fascia cata with reproduction of pain.  Sensation is intact to light touch throughout the lower extremities.  Reflexes are   2+ patellar and Achilles      Manual muscle testing reveals:  Right /Left out of 5     5/5 hip flexors  5/5 knee flexors  5/5 knee extensors  5/5 ankle plantar flexors  5/5 ankle dorsiflexors  5/5   ankle evertors

## 2021-12-27 NOTE — LETTER
12/27/2021         RE: Cristofer Del Cid  [address]   [place] [facility] [address]        Dear Colleague,    Thank you for referring your patient, Cristofer Del Cid, to the [facility] [place]. Please see a copy of my visit note below.    Assessment/Plan:      Cristofer was seen today for back pain, leg pain and letter for school/work.    Diagnoses and all orders for this visit:    Lumbar spine pain  -     Physical Therapy Referral; Future    Schmorl's nodes of lumbar region  -     Physical Therapy Referral; Future    Annular tear  -     Physical Therapy Referral; Future    Myofascial pain  -     Physical Therapy Referral; Future    Fusion of sacral region of spine  -     Physical Therapy Referral; Future    Sleep difficulties         Assessment: Pleasant 46 year old male with a history of gastroesophageal reflux controlled by diet and status post right sacroiliac joint fusion with:     1.  Persistent but improving low back pain after lifting a box of books at work on or around November 2.  He has a Schmorl's node in the superior endplate of L5 with mild edema as well as a disc bulge and annular tear at L5-S1 both of which could be causing his severe subacute pain.  He also has superimposed myofascial pain in the lumbar spine.  These findings are superimposed on degenerative disc disease throughout the lumbar spine.  Has made some gains with up to 70% improvement with physical therapy and using medications.  He takes meloxicam or cyclobenzaprine occasionally.  Had been using some Tylenol 3 or hydrocodone at bedtime on days that he works and no longer is needing this.  Was previously having significant pain and needed to work every other day.  Now feels he can work daily with 4-hour shifts.     2.  Myofascial pain in the left anterior lateral thigh tensor fascia cata.    Discussion:    1.  We discussed the diagnosis again and treatment options.  He is having slow steady progress with Medrol  Dosepak along with physical therapy and other medications.  He is up to 70% improved and I feel that we can advance his work restriction but is still having pain in the lumbar spine with too much activity.    2.  Advance physical therapy to a MedX program to see if he is comfortable on the MedX machine.  This will be for more aggressive lumbar strengthening.    3.  May continue with cyclobenzaprine and meloxicam as needed.    4.  Advance work restrictions to 10 pound lift limit and 4-hour shifts with allowing him to changing position as needed for comfort.    5.Can consider lumbar epidural but he is not interested.    6.  Follow-up with me in 2 weeks to see if we can continue to advance his restrictions.    It was our pleasure caring for your patient today, if there any questions or concerns please do not hesitate to contact us.      Subjective:   Patient ID: Cristofer Del Cid is a 46 year old male.    History of Present Illness: *Patient presents for follow-up of lumbar spine pain.  This is the lumbosacral junction in the thoracic spine.  He is having some left anterior thigh pain now after doing more exercises and physical therapy which occasionally radiates down to the mid thigh.  His back pain is the most significant issue for him worse with sitting or standing for long periods.  Better with walking or lying down.  Taking meloxicam occasionally along with cyclobenzaprine.  Had some hydrocodone but no longer needing that.  The last few shifts at work he has been able to work 4 hours without being sore the next day feels that things are improving up to 70% since the beginning 30% remaining.  Pain is a 9/10 at worst 3/10 today 1/10 at best.  Took Medrol Dosepak after last visit and did very well with the medication.  It was helpful for his pain.      Imaging: I personally reviewed MRI images of the lumbar spine November 4, 2021 along with x-rays of the pelvis.  X-rays of the pelvis reveal no acute abnormality postop  changes of the SI joint.  MRI lumbar spine reveals the SI fusion.  He has degenerative changes with mild disc height loss L4-5 L5-S1 with irregularity L4-5 with a Schmorl's node in the superior endplate of L5 with some edema which appears mild.  Central disc bulge with high intensity zone L5-S1.  There is mild lateral recess stenosis L3-4.  Mild fluid in the facets at L3-4.  No high-grade central stenosis and no high-grade foraminal stenosis.  No lateral recess stenosis.    Review of Systems: Pertinent positives: Weakness in his back.  Pertinent negatives: No numbness or tingling.  No bowel or bladder incontinence.  No urinary retention.  No fevers, unintentional weight loss, balance changes, headaches, frequent falling, difficulty swallowing, or coordination difficulties.  All others reviewed are negative.           Past Medical History:   Diagnosis Date     GERD (gastroesophageal reflux disease)        The following portions of the patient's history were reviewed and updated as appropriate: allergies, current medications, past family history, past medical history, past social history, past surgical history and problem list.           Objective:   Physical Exam:    /67 (BP Location: Right arm, Patient Position: Sitting)   Pulse 87   SpO2 96%   There is no height or weight on file to calculate BMI.      General: Alert and oriented with normal affect. Attention, knowledge, memory, and language are intact. No acute distress.   Eyes: Sclerae are clear.  Respirations: Unlabored. CV: No lower extremity edema.  Skin: No rashes seen.    Gait:  Nonantalgic  Tenderness over the left tensor fascia cata with reproduction of pain.  Sensation is intact to light touch throughout the lower extremities.  Reflexes are   2+ patellar and Achilles      Manual muscle testing reveals:  Right /Left out of 5     5/5 hip flexors  5/5 knee flexors  5/5 knee extensors  5/5 ankle plantar flexors  5/5 ankle dorsiflexors  5/5   ankle  evertors      Again, thank you for allowing me to participate in the care of your patient.        Sincerely,        Roberto Mcmillan, DO

## 2021-12-27 NOTE — LETTER
December 27, 2021      Cristofer TRISTEN Del Cid  [address] [facility] [address]        To Whom It May Concern:    Cristofer RAMON Maria Eugenia was seen in our clinic. He may return to work with the following: limited to light duty -  lifting no greater than 10 pounds with no lift/bend/twist. 4 hour shifts. Allow to change positions as needed for comfort. Valid 2 weeks until follow-up.      Sincerely,        Roberto Mcmillan, DO

## 2021-12-28 ENCOUNTER — HOSPITAL ENCOUNTER (OUTPATIENT)
Dept: PHYSICAL THERAPY | Facility: REHABILITATION | Age: 46
End: 2021-12-28
Payer: OTHER MISCELLANEOUS

## 2021-12-28 DIAGNOSIS — M62.81 WEAKNESS OF TRUNK MUSCULATURE: Primary | ICD-10-CM

## 2021-12-28 PROCEDURE — 97110 THERAPEUTIC EXERCISES: CPT | Mod: GP | Performed by: PHYSICAL THERAPIST

## 2021-12-28 PROCEDURE — 97530 THERAPEUTIC ACTIVITIES: CPT | Mod: GP | Performed by: PHYSICAL THERAPIST

## 2022-01-05 ENCOUNTER — HOSPITAL ENCOUNTER (OUTPATIENT)
Dept: PHYSICAL THERAPY | Facility: CLINIC | Age: 47
End: 2022-01-05
Payer: OTHER MISCELLANEOUS

## 2022-01-05 DIAGNOSIS — M54.50 LUMBAR SPINE PAIN: ICD-10-CM

## 2022-01-05 DIAGNOSIS — M62.81 WEAKNESS OF TRUNK MUSCULATURE: Primary | ICD-10-CM

## 2022-01-05 DIAGNOSIS — M79.18 MYOFASCIAL PAIN: ICD-10-CM

## 2022-01-05 DIAGNOSIS — M51.46 SCHMORL'S NODES OF LUMBAR REGION: ICD-10-CM

## 2022-01-05 DIAGNOSIS — M51.9 ANNULAR TEAR: ICD-10-CM

## 2022-01-05 PROCEDURE — 97110 THERAPEUTIC EXERCISES: CPT | Mod: GP | Performed by: PHYSICAL THERAPIST

## 2022-01-05 NOTE — PROGRESS NOTES
Daily Assessment:  Patient seen for 1st visit transfer to [facility] with lumbar radiculitis and disc herniation. The patient demonstrates well below average strength per age matched norms with limited ROM, would benefit from addition of Medx to PT POC. Will have to take it slow with Medx.    Date 1/5/2022   Lumbar Parameters    Top Dead Center (TDC) 18   Counterbalance (CB) 577   Seat Pad 1   Femur Restraint 6   Week/Visit    Enter Week/Visit # 1/1   Weight (lbs) 164# Max  80 ex dec to 60 ex   Reps (#) 20   Time 144   ROM (degrees) 6-33   Pain    Flex:Ext ratio 4.97:1     Date 1/5/22   Exercise    Treadmill X  5min   Rotary Torso 90 seconds  20#'s started R   VISHAL X 10   Prone Press ups X 10   Standing Extensions X 10   Supine SLR X 10   All 4's cat/cow X 5   Bird/dog X 10   Seated hip flexion X 10   Prone Plank X 10   Prone Quad stretch X 10   Mio pose X 30 seconds   LTR X 10     Jonathan Springer, PT

## 2022-01-09 ENCOUNTER — HEALTH MAINTENANCE LETTER (OUTPATIENT)
Age: 47
End: 2022-01-09

## 2022-01-17 ENCOUNTER — HOSPITAL ENCOUNTER (OUTPATIENT)
Dept: PHYSICAL THERAPY | Facility: CLINIC | Age: 47
End: 2022-01-17
Payer: OTHER MISCELLANEOUS

## 2022-01-17 DIAGNOSIS — M54.50 LUMBAR SPINE PAIN: ICD-10-CM

## 2022-01-17 DIAGNOSIS — M62.81 WEAKNESS OF TRUNK MUSCULATURE: Primary | ICD-10-CM

## 2022-01-17 DIAGNOSIS — M79.18 MYOFASCIAL PAIN: ICD-10-CM

## 2022-01-17 DIAGNOSIS — M51.9 ANNULAR TEAR: ICD-10-CM

## 2022-01-17 DIAGNOSIS — M51.46 SCHMORL'S NODES OF LUMBAR REGION: ICD-10-CM

## 2022-01-17 PROCEDURE — 97110 THERAPEUTIC EXERCISES: CPT | Mod: GP | Performed by: PHYSICAL THERAPIST

## 2022-01-17 NOTE — PROGRESS NOTES
Daily Assessment:  Patient seen for 2nd follow up since transfer to [facility] with lumbar radiculitis and disc herniation. Patient with good tolerance to Medx today with appropriate reps to fatigue, now able to get to full extension with later repetitions. Will have to take it slow with Medx. Progressed HEP with further strengthening today.    Date 1/17/2022 1/5/2022   Lumbar Parameters     Top Dead Center (TDC) 18 18   Counterbalance (CB) 577 577   Seat Pad 1 1   Femur Restraint 6 6   Week/Visit     Enter Week/Visit # 1/2 1/1   Weight (lbs) 64# 164# Max  80 ex dec to 60 ex   Reps (#) 20 20   Time  144   ROM (degrees) 6-33 6-33   Pain     Flex:Ext ratio  4.97:1     Date 1/17/2022 1/5/22   Exercise     Treadmill X 5 min X  5min   Rotary Torso 90 seconds  22#'s started L 20#'s started R   VISHAL  X 10   Prone Press ups  X 10   Standing Extensions  X 10   Supine SLR  X 10   All 4's cat/cow  X 5   Bird/dog  X 10   Seated hip flexion  X 10   Prone Plank  X 10   Prone Quad stretch  X 10   Mio pose  X 30 seconds   LTR  X 10     Supermans X 10    Banded Deadlifts X 10 2 purple bands    TA SLR X 10      Jonathan Springer, PT

## 2022-01-17 NOTE — ADDENDUM NOTE
Encounter addended by: Jonathan Springer, PT on: 1/17/2022 4:05 PM   Actions taken: Charge Capture section accepted

## 2022-01-20 ENCOUNTER — HOSPITAL ENCOUNTER (OUTPATIENT)
Dept: PHYSICAL THERAPY | Facility: CLINIC | Age: 47
End: 2022-01-20
Payer: OTHER MISCELLANEOUS

## 2022-01-20 DIAGNOSIS — M54.50 LUMBAR SPINE PAIN: ICD-10-CM

## 2022-01-20 DIAGNOSIS — M62.81 WEAKNESS OF TRUNK MUSCULATURE: Primary | ICD-10-CM

## 2022-01-20 PROCEDURE — 97110 THERAPEUTIC EXERCISES: CPT | Mod: GP | Performed by: PHYSICAL THERAPIST

## 2022-01-20 NOTE — PROGRESS NOTES
Daily Assessment:  Patient seen for 3rd follow up since transfer to [facility] with lumbar radiculitis and disc herniation. Patient is demonstrating improving tolerance for exercise and good acceptance for weight challenge on Medx. PT provided education on improving pelvic positioning when standing.    Date 1/20/22 1/17/2022 1/5/2022   Lumbar Parameters      Top Dead Center (TDC) 18 18 18   Counterbalance (CB) [phone]   Seat Pad 1 1 1   Femur Restraint 6 6 6   Week/Visit      Enter Week/Visit # 2/1 1/2 1/1   Weight (lbs) 68# 64# 164# Max  80 ex dec to 60 ex   Reps (#) 21 20 20   Time 153 s  144   ROM (degrees) 6-33 6-33 6-33   Pain      Flex:Ext ratio   4.97:1     Date 1/20/22 1/17/2022 1/5/22   Exercise      Treadmill X 5 min X 5 min X  5min   Rotary Torso 90 seconds  24# to R 22#'s started L 20#'s started R   VISHAL   X 10   Prone Press ups   X 10   Standing Extensions   X 10   Supine SLR   X 10   All 4's cat/cow   X 5   Bird/dog   X 10   Seated hip flexion   X 10   Prone Plank   X 10   Prone Quad stretch   X 10   Mio pose   X 30 seconds   LTR   X 10     Supermans  X 10    Banded Deadlifts  X 10 2 purple bands    TA SLR  X 10    Reformer leg press DL x 20 all

## 2022-01-24 ENCOUNTER — HOSPITAL ENCOUNTER (OUTPATIENT)
Dept: PHYSICAL THERAPY | Facility: CLINIC | Age: 47
End: 2022-01-24
Payer: OTHER MISCELLANEOUS

## 2022-01-24 DIAGNOSIS — M54.50 LUMBAR SPINE PAIN: ICD-10-CM

## 2022-01-24 DIAGNOSIS — M51.9 ANNULAR TEAR: ICD-10-CM

## 2022-01-24 DIAGNOSIS — M79.18 MYOFASCIAL PAIN: ICD-10-CM

## 2022-01-24 DIAGNOSIS — M51.46 SCHMORL'S NODES OF LUMBAR REGION: ICD-10-CM

## 2022-01-24 DIAGNOSIS — M62.81 WEAKNESS OF TRUNK MUSCULATURE: Primary | ICD-10-CM

## 2022-01-24 PROCEDURE — 97110 THERAPEUTIC EXERCISES: CPT | Mod: GP | Performed by: PHYSICAL THERAPIST

## 2022-01-24 NOTE — PROGRESS NOTES
Daily Assessment:  Patient seen for 4th follow up since transfer to [facility] with lumbar radiculitis and disc herniation. Patient challenged with 5# weight increase on Medx today, will hold weight next session. Encouraged regular movement throughout the day, avoid sitting too long if able.     Date 1/24/2022 1/20/22 1/17/2022 1/5/2022   Lumbar Parameters       Top Dead Center (TDC) 18 18 18 18   Counterbalance (CB) [phone] 577   Seat Pad 1 1 1 1   Femur Restraint 6 6 6 6   Week/Visit       Enter Week/Visit # 2/2 2/1 1/2 1/1   Weight (lbs) 73# 68# 64# 164# Max  80 ex dec to 60 ex   Reps (#) 13 21 20 20   Time 134 s 153 s  144   ROM (degrees) 6-33 6-33 6-33 6-33   Pain Fatigue and mild pain upper lumbar/lower thoracic      Flex:Ext ratio    4.97:1     Date 1/24/2022 1/20/22 1/17/2022 1/5/22   Exercise       Treadmill X 5 min X 5 min X 5 min X  5min   Rotary Torso 90 seconds  26#'s to L 24# to R 22#'s started L 20#'s started R   VISHAL    X 10   Prone Press ups    X 10   Standing Extensions    X 10   Supine SLR    X 10   All 4's cat/cow    X 5   Bird/dog    X 10   Seated hip flexion    X 10   Prone Plank    X 10   Prone Quad stretch    X 10   Mio pose    X 30 seconds   LTR    X 10     Supermans   X 10    Banded Deadlifts   X 10 2 purple bands    TA SLR   X 10    Reformer leg press DL X 20 all springs DL x 20 all     Standing hip flexor stretch X 30 seconds      Reformer 90/90 pulldowns X 7 2R 1B

## 2022-01-28 ENCOUNTER — HOSPITAL ENCOUNTER (OUTPATIENT)
Dept: PHYSICAL THERAPY | Facility: CLINIC | Age: 47
End: 2022-01-28
Payer: OTHER MISCELLANEOUS

## 2022-01-28 DIAGNOSIS — M62.81 WEAKNESS OF TRUNK MUSCULATURE: Primary | ICD-10-CM

## 2022-01-28 DIAGNOSIS — M51.46 SCHMORL'S NODES OF LUMBAR REGION: ICD-10-CM

## 2022-01-28 DIAGNOSIS — M54.50 LUMBAR SPINE PAIN: ICD-10-CM

## 2022-01-28 DIAGNOSIS — M51.9 ANNULAR TEAR: ICD-10-CM

## 2022-01-28 DIAGNOSIS — M79.18 MYOFASCIAL PAIN: ICD-10-CM

## 2022-01-28 PROCEDURE — 97110 THERAPEUTIC EXERCISES: CPT | Mod: GP | Performed by: PHYSICAL THERAPIST

## 2022-01-28 PROCEDURE — 97140 MANUAL THERAPY 1/> REGIONS: CPT | Mod: GP | Performed by: PHYSICAL THERAPIST

## 2022-01-28 NOTE — PROGRESS NOTES
Daily Assessment:  Patient seen for 5th follow up since transfer to [facility] with lumbar radiculitis and disc herniation. Held Medx weight today due to challenge last session, able to tolerate more today. Initiation of manual therapy today to address ongoing pain and tightness. Patient did have reduction in pain following this and will plan to continue this going forward.    Date 1/28/2022 1/24/2022 1/20/22 1/17/2022 1/5/2022   Lumbar Parameters        Top Dead Center (TDC) 18 18 18 18 18   Counterbalance (CB) 577 [phone] 577   Seat Pad 1 1 1 1 1   Femur Restraint 6 6 6 6 6   Week/Visit        Enter Week/Visit # 3/1 2/2 2/1 1/2 1/1   Weight (lbs) 73# 73# 68# 64# 164# Max  80 ex dec to 60 ex   Reps (#) 16 13 21 20 20   Time 138 134 s 153 s  144   ROM (degrees) 6-33 6-33 6-33 6-33 6-33   Pain  Fatigue and mild pain upper lumbar/lower thoracic      Flex:Ext ratio     4.97:1     Date 1/28/2022 1/28/2022 1/24/2022 1/20/22 1/17/2022 1/5/22   Exercise         Treadmill X 5 min X 5 min X 5 min X 5 min X 5 min X  5min   Rotary Torso 90 seconds  28#'s to L 26#'s to R 26#'s to L 24# to R 22#'s started L 20#'s started R   VISHAL      X 10   Prone Press ups      X 10   Standing Extensions      X 10   Supine SLR      X 10   All 4's cat/cow      X 5   Bird/dog      X 10   Seated hip flexion      X 10   Prone Plank      X 10   Prone Quad stretch      X 10   Mio pose      X 30 seconds   LTR      X 10     Supermans     X 10    Banded Deadlifts     X 10 2 purple bands    TA SLR     X 10    Reformer leg press   DL X 20 all springs DL x 20 all     Standing hip flexor stretch   X 30 seconds      Reformer 90/90 pulldowns   X 7 2R 1B

## 2022-01-31 ENCOUNTER — OFFICE VISIT (OUTPATIENT)
Dept: PHYSICAL MEDICINE AND REHAB | Facility: CLINIC | Age: 47
End: 2022-01-31
Payer: OTHER MISCELLANEOUS

## 2022-01-31 VITALS — DIASTOLIC BLOOD PRESSURE: 77 MMHG | HEART RATE: 68 BPM | SYSTOLIC BLOOD PRESSURE: 123 MMHG

## 2022-01-31 DIAGNOSIS — M54.50 LUMBAR SPINE PAIN: ICD-10-CM

## 2022-01-31 DIAGNOSIS — M51.9 ANNULAR TEAR: ICD-10-CM

## 2022-01-31 DIAGNOSIS — M43.28 FUSION OF SACRAL REGION OF SPINE: ICD-10-CM

## 2022-01-31 DIAGNOSIS — M51.46 SCHMORL'S NODES OF LUMBAR REGION: ICD-10-CM

## 2022-01-31 DIAGNOSIS — M79.18 MYOFASCIAL PAIN: ICD-10-CM

## 2022-01-31 DIAGNOSIS — M25.552 HIP PAIN, LEFT: Primary | ICD-10-CM

## 2022-01-31 PROCEDURE — 99214 OFFICE O/P EST MOD 30 MIN: CPT | Performed by: PHYSICAL MEDICINE & REHABILITATION

## 2022-01-31 ASSESSMENT — PAIN SCALES - GENERAL: PAINLEVEL: MILD PAIN (3)

## 2022-01-31 NOTE — LETTER
January 31, 2022      Cristofer J Maria Eugenia  [address] [facility] [address]        To Whom It May Concern:    Cristofer RAMON Pavancristianoswald was seen in our clinic. He may return to work with the following:  limited to light duty -  lifting no greater than 10 pounds with no lift/bend/twist. 4 hour shifts. Allow to change positions as needed for comfort. Valid 2 weeks until follow-up.      Sincerely,        Roberto Mcmillan, DO

## 2022-01-31 NOTE — PATIENT INSTRUCTIONS
1. An MRI was ordered for you today.  You will be contacted by scheduling within 3 days.    If you are not contacted, please call Radiology at [phone].

## 2022-01-31 NOTE — PROGRESS NOTES
Assessment/Plan:      Cristofer was seen today for back pain.    Diagnoses and all orders for this visit:    Hip pain, left  -     MR Pelvis Muscular Tissue wo Contrast; Future    Myofascial pain  -     MR Pelvis Muscular Tissue wo Contrast; Future    Lumbar spine pain  -     MR Pelvis Muscular Tissue wo Contrast; Future    Annular tear    Schmorl's nodes of lumbar region    Fusion of sacral region of spine         Assessment: Pleasant 47 year old male with a history of gastroesophageal reflux controlled by diet and status post right sacroiliac joint fusion with:     1.  Persistent low back pain after lifting a box of books at work on or around November 2.  He has a Schmorl's node in the superior endplate of L5 with mild edema as well as a disc bulge and annular tear at L5-S1 both of which could be causing his severe subacute pain.  He also has significant left groin pain now which is described as a stiffness in the groin to the lateral hip but does radiate to the lumbar spine.  These findings are superimposed on degenerative disc disease throughout the lumbar spine.  He has had some improvement from his initial presentation which he has reported up to 70%  with physical therapy and using medications.  He takes ibuprofen or cyclobenzaprine occasionally.  Had been using some Tylenol 3 or hydrocodone at bedtime on days that he works and no longer is needing this.  He has waxing waning of symptoms depending on his activity levels is working hard with MedX however is having a changing symptoms of the left groin and lateral hip has some neural tension signs with seated straight leg raise on the right today.  He generally is able to work 4 hours light duty of 10 pounds lifting and can make his number of shifts per week but sometimes has to call in and change shifts due to flare of his pain.        2.  Myofascial pain in the left anterior lateral thigh tensor fascia cata.    3.  He has had some intermittent left upper back  pain will monitor.    Discussion:    1.  His pain remains fairly severe for an annular tear Schmorl's node and I am concerned that there is something else going on in his pelvis such as hernia or hip flexor issue causing some of his pain.  We discussed the need for further imaging.  I also discussed medications and work restrictions.    2.  Of his pelvis to evaluate his left hip along with gluteal tissues hip flexors I recommend an MRI and inguinal region.  Need to evaluate for other pathology contributing to his pain.    3.  Continue current medications.    4.  We will keep him on his current work restrictions of light duty 10 pound lift limit 4-hour shiftsAllowed to change position as needed for comfort.    5.  Follow-up 2 weeks.      It was our pleasure caring for your patient today, if there any questions or concerns please do not hesitate to contact us.      Subjective:   Patient ID: Cristofer Del Cid is a 47 year old male.    History of Present Illness:   Patient presents for follow-up of low back pain.  Has had improvement overall but still having significant pain and limited activities.  He has back pain at the lumbosacral junction but now more significantly is severe tightness through the left groin into the left hip flexors lateral thigh not past the knee wrapping around to the gluteal region.  This occurs after sitting.  Worse with lifting his right leg feels a pull down his left leg.  Better with laying down or walking.  Standing also causes pain to increase after a short time.  3/10 pain today.  Only taking ibuprofen occasionally and cyclobenzaprine.  He is able to work 4-hour shifts.  Is difficult for him to work at the [facility] [facility] [employer] due to limited space and decreased ability to get up and move as needed.  He has been working with [employer] physical therapy tells me he is overworking his activities there.  He can generally work his shifts without an issue of 4 hours but occasionally has to call in but is able  to get them all in each week.  Pain is a 3/10 today.  He also gets some thoracic spine pain intermittently upper back with turning his head to the left.      Imaging: MRI images and report lumbar spine from November 2021 personally reviewed.  There is L3-4 mild spinal stenosis related to degenerative changes of the disc with disc height loss.  Mild fluid in the facet joints mild to moderate lateral recess stenosis.  L4-5 bilateral facet arthropathy no central stenosis or foraminal stenosis.  L5-S1 there is a small focal high intensity zone consistent with annular tear no central or foraminal stenosis.  Prior SI fusion on the right.  Hemangioma L3 vertebral body.  There is some mild edema in the superior endplate of L5 at the site of of Schmorl's node.    Review of Systems: Pertinent positives: Numbness tingling weakness left thigh anterior lateral thigh not past the knee lumbar spine..  Pertinent negatives:   No bowel or bladder incontinence.  No urinary retention.  No fevers, unintentional weight loss, balance changes, headaches, frequent falling, difficulty swallowing, or coordination difficulties.  All others reviewed are negative.    Past Medical History:   Diagnosis Date     GERD (gastroesophageal reflux disease)        The following portions of the patient's history were reviewed and updated as appropriate: allergies, current medications, past family history, past medical history, past social history, past surgical history and problem list.           Objective:   Physical Exam:    /77 (BP Location: Right arm, Patient Position: Sitting, Cuff Size: Adult Regular)   Pulse 68   There is no height or weight on file to calculate BMI.      General: Alert and oriented with normal affect. Attention, knowledge, memory, and language are intact. No acute distress.   Eyes: Sclerae are clear.  Respirations: Unlabored. CV: No lower extremity edema.  Skin: No rashes seen.    Gait:  Nonantalgic  Positive supine straight  leg raise on the left improved with bending of the knee.  Negative supine cross straight leg raise positive active cross straight leg raise from seated.  Negative CARRIE test for hip pain.  Sensation is intact to light touch throughout the  lower extremities.  Reflexes are  2+ patellar and Achilles     Manual muscle testing reveals:  Right /Left out of 5     5/5 hip flexors  5/5 knee flexors  5/5 knee extensors  5/5 ankle plantar flexors  5/5 ankle dorsiflexors  5/5  EHL

## 2022-01-31 NOTE — LETTER
1/31/2022         RE: Cristofer Del Cid  [address]   [place] [facility] [address]        Dear Colleague,    Thank you for referring your patient, Cristofer Del Cid, to the [facility] [place]. Please see a copy of my visit note below.    Assessment/Plan:      Cristofer was seen today for back pain.    Diagnoses and all orders for this visit:    Hip pain, left  -     MR Pelvis Muscular Tissue wo Contrast; Future    Myofascial pain  -     MR Pelvis Muscular Tissue wo Contrast; Future    Lumbar spine pain  -     MR Pelvis Muscular Tissue wo Contrast; Future    Annular tear    Schmorl's nodes of lumbar region    Fusion of sacral region of spine         Assessment: Pleasant 47 year old male with a history of gastroesophageal reflux controlled by diet and status post right sacroiliac joint fusion with:     1.  Persistent low back pain after lifting a box of books at work on or around November 2.  He has a Schmorl's node in the superior endplate of L5 with mild edema as well as a disc bulge and annular tear at L5-S1 both of which could be causing his severe subacute pain.  He also has significant left groin pain now which is described as a stiffness in the groin to the lateral hip but does radiate to the lumbar spine.  These findings are superimposed on degenerative disc disease throughout the lumbar spine.  He has had some improvement from his initial presentation which he has reported up to 70%  with physical therapy and using medications.  He takes ibuprofen or cyclobenzaprine occasionally.  Had been using some Tylenol 3 or hydrocodone at bedtime on days that he works and no longer is needing this.  He has waxing waning of symptoms depending on his activity levels is working hard with MedX however is having a changing symptoms of the left groin and lateral hip has some neural tension signs with seated straight leg raise on the right today.  He generally is able to work 4 hours light duty of 10  pounds lifting and can make his number of shifts per week but sometimes has to call in and change shifts due to flare of his pain.        2.  Myofascial pain in the left anterior lateral thigh tensor fascia cata.    3.  He has had some intermittent left upper back pain will monitor.    Discussion:    1.  His pain remains fairly severe for an annular tear Schmorl's node and I am concerned that there is something else going on in his pelvis such as hernia or hip flexor issue causing some of his pain.  We discussed the need for further imaging.  I also discussed medications and work restrictions.    2.  Of his pelvis to evaluate his left hip along with gluteal tissues hip flexors I recommend an MRI and inguinal region.  Need to evaluate for other pathology contributing to his pain.    3.  Continue current medications.    4.  We will keep him on his current work restrictions of light duty 10 pound lift limit 4-hour shiftsAllowed to change position as needed for comfort.    5.  Follow-up 2 weeks.      It was our pleasure caring for your patient today, if there any questions or concerns please do not hesitate to contact us.      Subjective:   Patient ID: Cristofer Del Cid is a 47 year old male.    History of Present Illness:   Patient presents for follow-up of low back pain.  Has had improvement overall but still having significant pain and limited activities.  He has back pain at the lumbosacral junction but now more significantly is severe tightness through the left groin into the left hip flexors lateral thigh not past the knee wrapping around to the gluteal region.  This occurs after sitting.  Worse with lifting his right leg feels a pull down his left leg.  Better with laying down or walking.  Standing also causes pain to increase after a short time.  3/10 pain today.  Only taking ibuprofen occasionally and cyclobenzaprine.  He is able to work 4-hour shifts.  Is difficult for him to work at the [employer] [facility] [employer] [facility] [facility] [employer] due to  limited space and decreased ability to get up and move as needed.  He has been working with [employer] physical therapy tells me he is overworking his activities there.  He can generally work his shifts without an issue of 4 hours but occasionally has to call in but is able to get them all in each week.  Pain is a 3/10 today.  He also gets some thoracic spine pain intermittently upper back with turning his head to the left.      Imaging: MRI images and report lumbar spine from November 2021 personally reviewed.  There is L3-4 mild spinal stenosis related to degenerative changes of the disc with disc height loss.  Mild fluid in the facet joints mild to moderate lateral recess stenosis.  L4-5 bilateral facet arthropathy no central stenosis or foraminal stenosis.  L5-S1 there is a small focal high intensity zone consistent with annular tear no central or foraminal stenosis.  Prior SI fusion on the right.  Hemangioma L3 vertebral body.  There is some mild edema in the superior endplate of L5 at the site of of Schmorl's node.    Review of Systems: Pertinent positives: Numbness tingling weakness left thigh anterior lateral thigh not past the knee lumbar spine..  Pertinent negatives:   No bowel or bladder incontinence.  No urinary retention.  No fevers, unintentional weight loss, balance changes, headaches, frequent falling, difficulty swallowing, or coordination difficulties.  All others reviewed are negative.    Past Medical History:   Diagnosis Date     GERD (gastroesophageal reflux disease)        The following portions of the patient's history were reviewed and updated as appropriate: allergies, current medications, past family history, past medical history, past social history, past surgical history and problem list.           Objective:   Physical Exam:    /77 (BP Location: Right arm, Patient Position: Sitting, Cuff Size: Adult Regular)   Pulse 68   There is no height or weight on file to calculate  BMI.      General: Alert and oriented with normal affect. Attention, knowledge, memory, and language are intact. No acute distress.   Eyes: Sclerae are clear.  Respirations: Unlabored. CV: No lower extremity edema.  Skin: No rashes seen.    Gait:  Nonantalgic  Positive supine straight leg raise on the left improved with bending of the knee.  Negative supine cross straight leg raise positive active cross straight leg raise from seated.  Negative CARRIE test for hip pain.  Sensation is intact to light touch throughout the  lower extremities.  Reflexes are  2+ patellar and Achilles     Manual muscle testing reveals:  Right /Left out of 5     5/5 hip flexors  5/5 knee flexors  5/5 knee extensors  5/5 ankle plantar flexors  5/5 ankle dorsiflexors  5/5  EHL        Again, thank you for allowing me to participate in the care of your patient.        Sincerely,        Roberto Mcmillan DO

## 2022-02-01 ENCOUNTER — HOSPITAL ENCOUNTER (OUTPATIENT)
Dept: PHYSICAL THERAPY | Facility: CLINIC | Age: 47
End: 2022-02-01
Payer: OTHER MISCELLANEOUS

## 2022-02-01 DIAGNOSIS — M62.81 WEAKNESS OF TRUNK MUSCULATURE: Primary | ICD-10-CM

## 2022-02-01 DIAGNOSIS — M79.18 MYOFASCIAL PAIN: ICD-10-CM

## 2022-02-01 DIAGNOSIS — M54.50 LUMBAR SPINE PAIN: ICD-10-CM

## 2022-02-01 DIAGNOSIS — M51.9 ANNULAR TEAR: ICD-10-CM

## 2022-02-01 DIAGNOSIS — M51.46 SCHMORL'S NODES OF LUMBAR REGION: ICD-10-CM

## 2022-02-01 PROCEDURE — 97110 THERAPEUTIC EXERCISES: CPT | Mod: GP | Performed by: PHYSICAL THERAPIST

## 2022-02-01 PROCEDURE — 97140 MANUAL THERAPY 1/> REGIONS: CPT | Mod: GP | Performed by: PHYSICAL THERAPIST

## 2022-02-01 NOTE — PROGRESS NOTES
Daily Assessment:  Patient seen for 6th follow up since transfer to [facility] with lumbar radiculitis and disc herniation. He is doing well compared to the last visit and continues to note improvement in overall function since starting therapy. Patient to have MRI next week to evaluate the L anterior hip pain.    Date 2/1/22 1/28/2022 1/24/2022 1/20/22 1/17/2022 1/5/2022   Lumbar Parameters         Top Dead Center (TDC) 18 18 18 18 18 18   Counterbalance (CB) 577 577 577 577 577 577   Seat Pad 1 1 1 1 1 1   Femur Restraint 6 6 6 6 6 6   Week/Visit         Enter Week/Visit # 3/2 3/1 2/2 2/1 1/2 1/1   Weight (lbs) 74# 73# 73# 68# 64# 164# Max  80 ex dec to 60 ex   Reps (#) 12 16 13 21 20 20   Time 123s 138 134 s 153 s  144   ROM (degrees) 6-33 6-33 6-33 6-33 6-33 6-33   Pain   Fatigue and mild pain upper lumbar/lower thoracic      Flex:Ext ratio      4.97:1     Date 2/1/22 1/28/2022 1/28/2022 1/24/2022 1/20/22 1/17/2022 1/5/22   Exercise          Treadmill X 5 min X 5 min X 5 min X 5 min X 5 min X 5 min X  5min   Rotary Torso 90 seconds  28# to R 28#'s to L 26#'s to R 26#'s to L 24# to R 22#'s started L 20#'s started R   VISHAL       X 10   Prone Press ups       X 10   Standing Extensions       X 10   Supine SLR       X 10   All 4's cat/cow       X 5   Bird/dog       X 10   Seated hip flexion       X 10   Prone Plank       X 10   Prone Quad stretch       X 10   Mio pose       X 30 seconds   LTR       X 10     Supermans      X 10    Banded Deadlifts      X 10 2 purple bands    TA SLR      X 10    Reformer leg press    DL X 20 all springs DL x 20 all     Standing hip flexor stretch    X 30 seconds      Reformer 90/90 pulldowns    X 7 2R 1B

## 2022-02-03 ENCOUNTER — HOSPITAL ENCOUNTER (OUTPATIENT)
Dept: PHYSICAL THERAPY | Facility: CLINIC | Age: 47
End: 2022-02-03
Payer: OTHER MISCELLANEOUS

## 2022-02-03 DIAGNOSIS — M54.50 LUMBAR SPINE PAIN: ICD-10-CM

## 2022-02-03 DIAGNOSIS — M79.18 MYOFASCIAL PAIN: ICD-10-CM

## 2022-02-03 DIAGNOSIS — M62.81 WEAKNESS OF TRUNK MUSCULATURE: Primary | ICD-10-CM

## 2022-02-03 PROCEDURE — 97110 THERAPEUTIC EXERCISES: CPT | Mod: GP | Performed by: PHYSICAL THERAPIST

## 2022-02-03 PROCEDURE — 97140 MANUAL THERAPY 1/> REGIONS: CPT | Mod: GP | Performed by: PHYSICAL THERAPIST

## 2022-02-03 NOTE — PROGRESS NOTES
Daily Assessment:  Patient seen for 7th follow up since transfer to [facility] with lumbar radiculitis and disc herniation. Significant improvement is demonstrated this session in his lumbar medx extension strength. See numbers below. PT continued with the manual therapy as well, due to much improvement noted following the previous session.    Lumbar MedX 4-week Re-test  2/3/22 Initial testing    AROM (full=  0-72  lumbar) 0-48 6-33   Max Extension Torque  252# 164#   Flex: ext ratio (ideal 1.4:1) 1.93:1 4.97:1         Date 2/3/22 2/1/22 1/28/2022 1/24/2022   1/20/22 1/17/2022 1/5/2022   Lumbar Parameters          Top Dead Center (TDC) 18 18 18 18 18 18 18   Counterbalance (CB) 577 577 577 577 577 577 577   Seat Pad 1 1 1 1 1 1 1   Femur Restraint 6 6 6 6 6 6 6   Week/Visit          Enter Week/Visit # 4 3/2 3/1 2/2 2/1 1/2 1/1   Weight (lbs) 252#Max  75# ex 74# 73# 73# 68# 64# 164# Max  80 ex dec to 60 ex   Reps (#) 11 12 16 13 21 20 20   Time 97 s 123s 138 134 s 153 s  144   ROM (degrees) 0-48 6-33 6-33 6-33 6-33 6-33 6-33   Pain    Fatigue and mild pain upper lumbar/lower thoracic      Flex:Ext ratio       4.97:1     Date 2/3/22 2/1/22 1/28/2022 1/28/2022 1/24/2022 1/20/22 1/17/2022 1/5/22   Exercise           Treadmill X 3 min X 5 min X 5 min X 5 min X 5 min X 5 min X 5 min X  5min   Rotary Torso 90 seconds  30# to L 28# to R 28#'s to L 26#'s to R 26#'s to L 24# to R 22#'s started L 20#'s started R   VISHAL        X 10   Prone Press ups        X 10   Standing Extensions        X 10   Supine SLR        X 10   All 4's cat/cow        X 5   Bird/dog        X 10   Seated hip flexion        X 10   Prone Plank        X 10   Prone Quad stretch        X 10   Mio pose        X 30 seconds   LTR        X 10     Supermans       X 10    Banded Deadlifts       X 10 2 purple bands    TA SLR       X 10    Reformer leg press     DL X 20 all springs DL x 20 all     Standing hip flexor stretch     X 30 seconds      Reformer 90/90  pulldowns     X 7 2R 1B

## 2022-02-08 ENCOUNTER — HOSPITAL ENCOUNTER (OUTPATIENT)
Dept: PHYSICAL THERAPY | Facility: CLINIC | Age: 47
End: 2022-02-08
Payer: OTHER MISCELLANEOUS

## 2022-02-08 DIAGNOSIS — M62.81 WEAKNESS OF TRUNK MUSCULATURE: Primary | ICD-10-CM

## 2022-02-08 DIAGNOSIS — M79.18 MYOFASCIAL PAIN: ICD-10-CM

## 2022-02-08 DIAGNOSIS — M54.50 LUMBAR SPINE PAIN: ICD-10-CM

## 2022-02-08 PROCEDURE — 97110 THERAPEUTIC EXERCISES: CPT | Mod: GP | Performed by: PHYSICAL THERAPIST

## 2022-02-08 PROCEDURE — 97140 MANUAL THERAPY 1/> REGIONS: CPT | Mod: GP | Performed by: PHYSICAL THERAPIST

## 2022-02-08 NOTE — PROGRESS NOTES
Daily Assessment:  Patient seen for 8th follow up since transfer to [place] with lumbar radiculitis and disc herniation.Cristofer continues to exercise well on the MedX machine and PT instructed the patient on seated pelvic tilt this session. This I to address the situation where he has less space to move around at work.     Lumbar MedX 4-week Re-test  2/3/22 Initial testing    AROM (full=  0-72  lumbar) 0-48 6-33   Max Extension Torque  252# 164#   Flex: ext ratio (ideal 1.4:1) 1.93:1 4.97:1         Date 2/8/22 2/3/22 2/1/22 1/28/2022 1/24/2022   1/20/22 1/17/2022 1/5/2022   Lumbar Parameters           Top Dead Center (TDC) 18 18 18 18 18 18 18 18   Counterbalance (CB) 577 577 577 577 577 577 577 577   Seat Pad 1 1 1 1 1 1 1 1   Femur Restraint 6 6 6 6 6 6 6 6   Week/Visit           Enter Week/Visit # 5 4 3/2 3/1 2/2 2/1 1/2 1/1   Weight (lbs) 252# max  75 ex     252#Max  75# ex 74# 73# 73# 68# 64# 164# Max  80 ex dec to 60 ex   Reps (#) 16 11 12 16 13 21 20 20   Time 154 s 97 s 123s 138 134 s 153 s  144   ROM (degrees) 0-48 0-48 6-33 6-33 6-33 6-33 6-33 6-33   Pain     Fatigue and mild pain upper lumbar/lower thoracic      Flex:Ext ratio        4.97:1     Date 2/8/22 2/3/22 2/1/22 1/28/2022 1/28/2022 1/24/2022 1/20/22 1/17/2022 1/5/22   Exercise            Treadmill X 3 min X 3 min X 5 min X 5 min X 5 min X 5 min X 5 min X 5 min X  5min   Rotary Torso 90 seconds  32# to R 30# to L 28# to R 28#'s to L 26#'s to R 26#'s to L 24# to R 22#'s started L 20#'s started R   VISHAL         X 10   Prone Press ups         X 10   Standing Extensions         X 10   Supine SLR         X 10   All 4's cat/cow         X 5   Bird/dog         X 10   Seated hip flexion         X 10   Prone Plank         X 10   Prone Quad stretch         X 10   Mio pose         X 30 seconds   LTR         X 10     Supermans        X 10    Banded Deadlifts        X 10 2 purple bands    TA SLR        X 10    Reformer leg press      DL X 20 all springs DL x 20 all      Standing hip flexor stretch      X 30 seconds      Reformer 90/90 pulldowns      X 7 2R 1B      Seated pelvic tilt X 10

## 2022-02-09 ENCOUNTER — HOSPITAL ENCOUNTER (OUTPATIENT)
Dept: MRI IMAGING | Facility: HOSPITAL | Age: 47
Discharge: HOME | End: 2022-02-09
Attending: PHYSICAL MEDICINE & REHABILITATION | Admitting: PHYSICAL MEDICINE & REHABILITATION
Payer: OTHER MISCELLANEOUS

## 2022-02-09 DIAGNOSIS — M54.50 LUMBAR SPINE PAIN: ICD-10-CM

## 2022-02-09 DIAGNOSIS — M79.18 MYOFASCIAL PAIN: ICD-10-CM

## 2022-02-09 DIAGNOSIS — M25.552 HIP PAIN, LEFT: ICD-10-CM

## 2022-02-09 PROCEDURE — 72195 MRI PELVIS W/O DYE: CPT

## 2022-02-21 ENCOUNTER — OFFICE VISIT (OUTPATIENT)
Dept: PHYSICAL MEDICINE AND REHAB | Facility: CLINIC | Age: 47
End: 2022-02-21
Payer: OTHER MISCELLANEOUS

## 2022-02-21 VITALS — DIASTOLIC BLOOD PRESSURE: 71 MMHG | HEART RATE: 72 BPM | SYSTOLIC BLOOD PRESSURE: 132 MMHG

## 2022-02-21 DIAGNOSIS — M51.9 ANNULAR TEAR: ICD-10-CM

## 2022-02-21 DIAGNOSIS — M54.50 LUMBAR SPINE PAIN: Primary | ICD-10-CM

## 2022-02-21 DIAGNOSIS — M51.46 SCHMORL'S NODES OF LUMBAR REGION: ICD-10-CM

## 2022-02-21 DIAGNOSIS — M79.18 MYOFASCIAL PAIN: ICD-10-CM

## 2022-02-21 DIAGNOSIS — M25.552 HIP PAIN, LEFT: ICD-10-CM

## 2022-02-21 DIAGNOSIS — M16.12 PRIMARY OSTEOARTHRITIS OF LEFT HIP: ICD-10-CM

## 2022-02-21 PROCEDURE — 99214 OFFICE O/P EST MOD 30 MIN: CPT | Performed by: PHYSICAL MEDICINE & REHABILITATION

## 2022-02-21 RX ORDER — MELOXICAM 15 MG/1
7.5-15 TABLET ORAL DAILY PRN
Qty: 30 TABLET | Refills: 1 | Status: SHIPPED | OUTPATIENT
Start: 2022-02-21

## 2022-02-21 ASSESSMENT — PAIN SCALES - GENERAL: PAINLEVEL: MILD PAIN (3)

## 2022-02-21 NOTE — PATIENT INSTRUCTIONS
1. [facility] for Work Hardening    2. Meloxicam (which is an anti-inflammatory) medication is prescribed today. Take 1  Tablet daily as needed for pain. This medication should be taken with food and water to prevent any stomach upset. Do not take ibuprofen/Advil/Motrin/Aleve/naproxen while you take meloxicam. Please call if you have any side effects.

## 2022-02-21 NOTE — PROGRESS NOTES
Assessment/Plan:      Cristofer was seen today for back pain and hip pain.    Diagnoses and all orders for this visit:    Lumbar spine pain  -     Physical Therapy Referral; Future  -     meloxicam (MOBIC) 15 MG tablet; Take 0.5-1 tablets (7.5-15 mg) by mouth daily as needed for pain    Hip pain, left  -     Physical Therapy Referral; Future    Myofascial pain  -     Physical Therapy Referral; Future  -     meloxicam (MOBIC) 15 MG tablet; Take 0.5-1 tablets (7.5-15 mg) by mouth daily as needed for pain    Annular tear  -     Physical Therapy Referral; Future  -     meloxicam (MOBIC) 15 MG tablet; Take 0.5-1 tablets (7.5-15 mg) by mouth daily as needed for pain    Schmorl's nodes of lumbar region  -     Physical Therapy Referral; Future  -     meloxicam (MOBIC) 15 MG tablet; Take 0.5-1 tablets (7.5-15 mg) by mouth daily as needed for pain    Primary osteoarthritis of left hip  -     Physical Therapy Referral; Future         Assessment: Pleasant 47 year old male with a history of gastroesophageal reflux controlled by diet and status post right sacroiliac joint fusion with:     1.   Persistent lumbar spine pain after lifting a box of books at work on or around November 2 2021.  He has a Schmorl's node in the superior endplate of L5 with mild edema as well as a disc bulge and annular tear at L5-S1 both of which could be causing his severe subacute pain.  He also has significant left groin pain now which is described as a stiffness in the groin to the lateral hip but does radiate to the lumbar spine.  These findings are superimposed on degenerative disc disease throughout the lumbar spine.    He has hip osteoarthritis left greater than right likely contributing.  He has had some improvement from his initial presentation which he has reported up to 70-80%  with physical therapy and using medications.  He takes  meloxicam and cyclobenzaprine occasionally.  Had been using some Tylenol 3 or hydrocodone at bedtime on days that he  works and no longer is needing this.  He has waxing waning of symptoms depending on his activity levels is working  with MedX however is having a changing symptoms of the left groin and lateral hip has some neural tension signs with seated straight leg raise .  He generally is able to work 4 hours light duty of 10 pounds lifting.  Has had to call in a few days but is able to make them up later on.     2.  Left hip pain and anterior thigh pain.  Has some pain consistent with osteoarthritis which is greater on the left hip.  Also some myofascial pain.      3. Myofascial pain in the left anterior lateral thigh tensor fascia cata.            Discussion:    1.  We discussed the diagnosis and treatment options.  He has Schmorl's node in the superior endplate of L5 along with small annular tear L5-S1 and some hip osteoarthritis.  Most significant issue appears to be myofascial pain but again can be related to the degenerative changes of his hip or lumbar spine.  He tells me his pain is improving but still has to limit his activities due to flares of his pain intermittently.  We discussed options of injections and he wants to stay away from injections of his hip or lumbar spine at this time as he is improving with therapy and time but it has been 3-1/2 months since his injury and still having significant work restrictions.  We discussed options of injections in his lumbar spine or hip and he is wanting to avoid injections at this time.    2.  I recommend work hardening program through [facility] physical therapy for aggressive strengthening of his lumbar spine and hips with work simulation to see if we can be more aggressive with advancing his work restrictions.  Subjectively he wants to avoid significant advancement of work restrictions as his pain flares.    3.  We will continue on work restrictions but advance him to 6-hour shifts and 15 pound lift limit.  He did miss 4 days over the past month and a half due to pain  flares after physical therapy mostly.  These were January 1 and 5 along with the 24th and February 8.  He tells me today that he was unable to go to those shifts but was able to make them up and I will write an excuse this time but in general do not like to retroactively provide excuses for work.  Hopefully with work hardening we can avoid further missed shifts.    4.  Refill meloxicam today as he takes this every other day.    5.  Continue cyclobenzaprine at bedtime.    6.  Follow-up with me in 1 month      It was our pleasure caring for your patient today, if there any questions or concerns please do not hesitate to contact us.      Subjective:   Patient ID: Cristofer Del Cid is a 47 year old male.    History of Present Illness: patient presents for follow-up of low back pain along with left hip and anterior thigh pain.  Symptoms are improved from initial presentation up to 80% but he still having issues with doing activities such as prolonged sitting standing lifting or turning.  Better with walking and stretching.  He is working with physical therapy was given some new stretches for his left hip and the left anterior hip and lateral hip pain have greatly improved and are gone at times with return with prolonged sitting.  His pain is a 6/10 at worst 3/10 today and at best.  His back pain is still at the lumbosacral junction feels stiff and tight.  Overall with therapy he is improving but still does not feel he can work more than 6 hours at a time and still needs a lift limit.  No radiation further down his leg at this time but his leg feels weak.  He needed to call in from work January 3, fifth, 24th and February 8.  These were for pain flares most of the time after physical therapy.  He was able to make up the shifts at a later date.  Still having some pain in the upper back at times as well.  Has had an MRI of the hip which was reviewed with him today.       Imaging: MRI of the pelvis imaging report personally  reviewed showing negative hips   For AVN.  There is some degenerative changes of both hips with thinning of the cartilage more significant on the left.  Postoperative right SI fusion.    MRI lumbar spine images personally reviewed as well showing some mild degenerative changes lumbar spine with annular tear L5-S1 small Schmorl's node L4-5 no significant central canal or foraminal stenosis.    Review of Systems: Pertinent positives: Numbness tingling weakness left leg.  Pertinent negatives:  No bowel or bladder incontinence.  No urinary retention.  No fevers, unintentional weight loss, balance changes, headaches, frequent falling, difficulty swallowing, or coordination difficulties.  All others reviewed are negative.           Past Medical History:   Diagnosis Date     GERD (gastroesophageal reflux disease)        The following portions of the patient's history were reviewed and updated as appropriate: allergies, current medications, past family history, past medical history, past social history, past surgical history and problem list.           Objective:   Physical Exam:    /71 (BP Location: Right arm, Patient Position: Sitting, Cuff Size: Adult Regular)   Pulse 72   There is no height or weight on file to calculate BMI.      General: Alert and oriented with normal affect. Attention, knowledge, memory, and language are intact. No acute distress.   Eyes: Sclerae are clear.  Respirations: Unlabored. CV: No lower extremity edema.  Skin: No rashes seen.    Gait:  Nonantalgic     full internal/external rotation from seated of the left hip.  Negative seated straight leg raise on the left.  Sensation is intact to light touch throughout the   lower extremities.  Reflexes are 2+ patellar and 1+ Achilles     Manual muscle testing reveals:  Right /Left out of 5     5/5 hip flexors  5/5 knee flexors  5/5 knee extensors  5/5 ankle plantar flexors  5/5 ankle dorsiflexors  5/5  EHL

## 2022-02-21 NOTE — LETTER
February 21, 2022      Cristofer Del Cid  [address] [facility] [id number]        To Whom It May Concern:    Cristofer J Maria Eugenia  was seen on 2/21/2022.  He was unable to work due to medical issues on 1/3/2022, 1/5/2022, 1/24/2022, and 2/8/2022.        Sincerely,        Roberto Mcmillan, DO

## 2022-02-21 NOTE — LETTER
2/21/2022         RE: Cristofer Del Cid  [address] [facility] [address]        Dear Colleague,    Thank you for referring your patient, Cristofer Del Cid, to the [facility] [place]. Please see a copy of my visit note below.    Assessment/Plan:      Cristofer was seen today for back pain and hip pain.    Diagnoses and all orders for this visit:    Lumbar spine pain  -     Physical Therapy Referral; Future  -     meloxicam (MOBIC) 15 MG tablet; Take 0.5-1 tablets (7.5-15 mg) by mouth daily as needed for pain    Hip pain, left  -     Physical Therapy Referral; Future    Myofascial pain  -     Physical Therapy Referral; Future  -     meloxicam (MOBIC) 15 MG tablet; Take 0.5-1 tablets (7.5-15 mg) by mouth daily as needed for pain    Annular tear  -     Physical Therapy Referral; Future  -     meloxicam (MOBIC) 15 MG tablet; Take 0.5-1 tablets (7.5-15 mg) by mouth daily as needed for pain    Schmorl's nodes of lumbar region  -     Physical Therapy Referral; Future  -     meloxicam (MOBIC) 15 MG tablet; Take 0.5-1 tablets (7.5-15 mg) by mouth daily as needed for pain    Primary osteoarthritis of left hip  -     Physical Therapy Referral; Future         Assessment: Pleasant 47 year old male with a history of gastroesophageal reflux controlled by diet and status post right sacroiliac joint fusion with:     1.   Persistent lumbar spine pain after lifting a box of books at work on or around November 2 2021.  He has a Schmorl's node in the superior endplate of L5 with mild edema as well as a disc bulge and annular tear at L5-S1 both of which could be causing his severe subacute pain.  He also has significant left groin pain now which is described as a stiffness in the groin to the lateral hip but does radiate to the lumbar spine.  These findings are superimposed on degenerative disc disease throughout the lumbar spine.    He has hip osteoarthritis left greater than right likely contributing.  He  has had some improvement from his initial presentation which he has reported up to 70-80%  with physical therapy and using medications.  He takes  meloxicam and cyclobenzaprine occasionally.  Had been using some Tylenol 3 or hydrocodone at bedtime on days that he works and no longer is needing this.  He has waxing waning of symptoms depending on his activity levels is working  with MedX however is having a changing symptoms of the left groin and lateral hip has some neural tension signs with seated straight leg raise .  He generally is able to work 4 hours light duty of 10 pounds lifting.  Has had to call in a few days but is able to make them up later on.     2.  Left hip pain and anterior thigh pain.  Has some pain consistent with osteoarthritis which is greater on the left hip.  Also some myofascial pain.      3. Myofascial pain in the left anterior lateral thigh tensor fascia cata.            Discussion:    1.  We discussed the diagnosis and treatment options.  He has Schmorl's node in the superior endplate of L5 along with small annular tear L5-S1 and some hip osteoarthritis.  Most significant issue appears to be myofascial pain but again can be related to the degenerative changes of his hip or lumbar spine.  He tells me his pain is improving but still has to limit his activities due to flares of his pain intermittently.  We discussed options of injections and he wants to stay away from injections of his hip or lumbar spine at this time as he is improving with therapy and time but it has been 3-1/2 months since his injury and still having significant work restrictions.  We discussed options of injections in his lumbar spine or hip and he is wanting to avoid injections at this time.    2.  I recommend work hardening program through [facility] physical therapy for aggressive strengthening of his lumbar spine and hips with work simulation to see if we can be more aggressive with advancing his work restrictions.   Subjectively he wants to avoid significant advancement of work restrictions as his pain flares.    3.  We will continue on work restrictions but advance him to 6-hour shifts and 15 pound lift limit.  He did miss 4 days over the past month and a half due to pain flares after physical therapy mostly.  These were January 1 and 5 along with the 24th and February 8.  He tells me today that he was unable to go to those shifts but was able to make them up and I will write an excuse this time but in general do not like to retroactively provide excuses for work.  Hopefully with work hardening we can avoid further missed shifts.    4.  Refill meloxicam today as he takes this every other day.    5.  Continue cyclobenzaprine at bedtime.    6.  Follow-up with me in 1 month      It was our pleasure caring for your patient today, if there any questions or concerns please do not hesitate to contact us.      Subjective:   Patient ID: Cristofer Del Cid is a 47 year old male.    History of Present Illness: patient presents for follow-up of low back pain along with left hip and anterior thigh pain.  Symptoms are improved from initial presentation up to 80% but he still having issues with doing activities such as prolonged sitting standing lifting or turning.  Better with walking and stretching.  He is working with physical therapy was given some new stretches for his left hip and the left anterior hip and lateral hip pain have greatly improved and are gone at times with return with prolonged sitting.  His pain is a 6/10 at worst 3/10 today and at best.  His back pain is still at the lumbosacral junction feels stiff and tight.  Overall with therapy he is improving but still does not feel he can work more than 6 hours at a time and still needs a lift limit.  No radiation further down his leg at this time but his leg feels weak.  He needed to call in from work January 3, fifth, 24th and February 8.  These were for pain flares most of the time  after physical therapy.  He was able to make up the shifts at a later date.  Still having some pain in the upper back at times as well.  Has had an MRI of the hip which was reviewed with him today.       Imaging: MRI of the pelvis imaging report personally reviewed showing negative hips   For AVN.  There is some degenerative changes of both hips with thinning of the cartilage more significant on the left.  Postoperative right SI fusion.    MRI lumbar spine images personally reviewed as well showing some mild degenerative changes lumbar spine with annular tear L5-S1 small Schmorl's node L4-5 no significant central canal or foraminal stenosis.    Review of Systems: Pertinent positives: Numbness tingling weakness left leg.  Pertinent negatives:  No bowel or bladder incontinence.  No urinary retention.  No fevers, unintentional weight loss, balance changes, headaches, frequent falling, difficulty swallowing, or coordination difficulties.  All others reviewed are negative.           Past Medical History:   Diagnosis Date     GERD (gastroesophageal reflux disease)        The following portions of the patient's history were reviewed and updated as appropriate: allergies, current medications, past family history, past medical history, past social history, past surgical history and problem list.           Objective:   Physical Exam:    /71 (BP Location: Right arm, Patient Position: Sitting, Cuff Size: Adult Regular)   Pulse 72   There is no height or weight on file to calculate BMI.      General: Alert and oriented with normal affect. Attention, knowledge, memory, and language are intact. No acute distress.   Eyes: Sclerae are clear.  Respirations: Unlabored. CV: No lower extremity edema.  Skin: No rashes seen.    Gait:  Nonantalgic     full internal/external rotation from seated of the left hip.  Negative seated straight leg raise on the left.  Sensation is intact to light touch throughout the   lower  extremities.  Reflexes are 2+ patellar and 1+ Achilles     Manual muscle testing reveals:  Right /Left out of 5     5/5 hip flexors  5/5 knee flexors  5/5 knee extensors  5/5 ankle plantar flexors  5/5 ankle dorsiflexors  5/5  EHL       Again, thank you for allowing me to participate in the care of your patient.        Sincerely,        Roberto Mcmillan, DO

## 2022-02-21 NOTE — LETTER
February 21, 2022      Cristofer TRISTEN Del Cid  [address] [facility] [address]        To Whom It May Concern:    Cristofer RAMON Maria Eugenia was seen in our clinic. He may return to work with the following: limited to light duty - lifting no greater than 15 pounds with no lift/bend/twist. 6 hour shifts. Allow to change positions as needed for comfort. Valid 4 weeks until follow-up.      Sincerely,        Roberto Mcmillan, DO

## 2022-02-22 ENCOUNTER — HOSPITAL ENCOUNTER (OUTPATIENT)
Dept: PHYSICAL THERAPY | Facility: CLINIC | Age: 47
End: 2022-02-22
Payer: OTHER MISCELLANEOUS

## 2022-02-22 DIAGNOSIS — M62.81 WEAKNESS OF TRUNK MUSCULATURE: Primary | ICD-10-CM

## 2022-02-22 DIAGNOSIS — M54.50 LUMBAR SPINE PAIN: ICD-10-CM

## 2022-02-22 DIAGNOSIS — M79.18 MYOFASCIAL PAIN: ICD-10-CM

## 2022-02-22 PROCEDURE — 97140 MANUAL THERAPY 1/> REGIONS: CPT | Mod: GP | Performed by: PHYSICAL THERAPIST

## 2022-02-22 PROCEDURE — 97110 THERAPEUTIC EXERCISES: CPT | Mod: GP | Performed by: PHYSICAL THERAPIST

## 2022-02-22 NOTE — PROGRESS NOTES
Daily Assessment:  Patient seen for 9th follow up since transfer to [facility] with lumbar radiculitis and disc herniation.Cristofer continues to exercise well on the MedX machine. He is responding very well to the MedX and manual therapy combo. He is noting 80% improvement through therapy.    Lumbar MedX 4-week Re-test  2/3/22 Initial testing    AROM (full=  0-72  lumbar) 0-48 6-33   Max Extension Torque  252# 164#   Flex: ext ratio (ideal 1.4:1) 1.93:1 4.97:1         Date 2/22/22 2/8/22 2/3/22 2/1/22 1/28/2022 1/24/2022   1/20/22 1/17/2022 1/5/2022   Lumbar Parameters            Top Dead Center (TDC) 18 18 18 18 18 18 18 18 18   Counterbalance (CB) 577 577 577 577 577 577 577 577 577   Seat Pad 1 1 1 1 1 1 1 1 1   Femur Restraint 6 6 6 6 6 6 6 6 6   Week/Visit            Enter Week/Visit # 6 5 4 3/2 3/1 2/2 2/1 1/2 1/1   Weight (lbs) 76# ex 252# max  75 ex     252#Max  75# ex 74# 73# 73# 68# 64# 164# Max  80 ex dec to 60 ex   Reps (#) 19 16 11 12 16 13 21 20 20   Time 143 s 154 s 97 s 123s 138 134 s 153 s  144   ROM (degrees) 0-48 0-48 0-48 6-33 6-33 6-33 6-33 6-33 6-33   Pain      Fatigue and mild pain upper lumbar/lower thoracic      Flex:Ext ratio         4.97:1     Date 2/22/22 2/8/22 2/3/22 2/1/22 1/28/2022 1/28/2022 1/24/2022 1/20/22 1/17/2022 1/5/22   Exercise             Treadmill X 4 miin X 3 min X 3 min X 5 min X 5 min X 5 min X 5 min X 5 min X 5 min X  5min   Rotary Torso 90 seconds  34# to L 32# to R 30# to L 28# to R 28#'s to L 26#'s to R 26#'s to L 24# to R 22#'s started L 20#'s started R   VISHAL          X 10   Prone Press ups          X 10   Standing Extensions          X 10   Supine SLR          X 10   All 4's cat/cow          X 5   Bird/dog          X 10   Seated hip flexion          X 10   Prone Plank          X 10   Prone Quad stretch          X 10   Mio pose          X 30 seconds   LTR          X 10     Supermans         X 10    Banded Deadlifts         X 10 2 purple bands    TA SLR         X 10     Reformer leg press       DL X 20 all springs DL x 20 all     Standing hip flexor stretch       X 30 seconds      Reformer 90/90 pulldowns       X 7 2R 1B      Seated pelvic tilt  X 10

## 2022-03-03 ENCOUNTER — HOSPITAL ENCOUNTER (OUTPATIENT)
Dept: PHYSICAL THERAPY | Facility: CLINIC | Age: 47
End: 2022-03-03
Payer: OTHER MISCELLANEOUS

## 2022-03-03 DIAGNOSIS — M62.81 WEAKNESS OF TRUNK MUSCULATURE: Primary | ICD-10-CM

## 2022-03-03 DIAGNOSIS — M79.18 MYOFASCIAL PAIN: ICD-10-CM

## 2022-03-03 DIAGNOSIS — M54.50 LUMBAR SPINE PAIN: ICD-10-CM

## 2022-03-03 PROCEDURE — 97140 MANUAL THERAPY 1/> REGIONS: CPT | Mod: GP | Performed by: PHYSICAL THERAPIST

## 2022-03-03 PROCEDURE — 97110 THERAPEUTIC EXERCISES: CPT | Mod: GP | Performed by: PHYSICAL THERAPIST

## 2022-03-03 NOTE — PROGRESS NOTES
Daily Assessment:  Patient seen for 10th follow up since transfer to [facility] with lumbar radiculitis and disc herniation.Cristofer continues to exercise well on the MedX machine. He is responding very well to the MedX and manual therapy combo. He is noting 80% improvement through therapy. He is going to discuss with his work comp provider to see if he can do both.    Lumbar MedX 4-week Re-test  2/3/22 Initial testing    AROM (full=  0-72  lumbar) 0-48 6-33   Max Extension Torque  252# 164#   Flex: ext ratio (ideal 1.4:1) 1.93:1 4.97:1         Date 3/3/22 2/22/22 2/8/22 2/3/22 2/1/22 1/28/2022 1/24/2022   1/20/22 1/17/2022 1/5/2022   Lumbar Parameters             Top Dead Center (TDC) 18 18 18 18 18 18 18 18 18 18   Counterbalance (CB) 577 577 577 577 577 577 577 577 577 577   Seat Pad 1 1 1 1 1 1 1 1 1 1   Femur Restraint 6 6 6 6 6 6 6 6 6 6   Week/Visit             Enter Week/Visit # 7 6 5 4 3/2 3/1 2/2 2/1 1/2 1/1   Weight (lbs) 78# ex 76# ex 252# max  75 ex     252#Max  75# ex 74# 73# 73# 68# 64# 164# Max  80 ex dec to 60 ex   Reps (#) 35 19 16 11 12 16 13 21 20 20   Time 203 s 143 s 154 s 97 s 123s 138 134 s 153 s  144   ROM (degrees) 0-48 0-48 0-48 0-48 6-33 6-33 6-33 6-33 6-33 6-33   Pain       Fatigue and mild pain upper lumbar/lower thoracic      Flex:Ext ratio          4.97:1     Date 3/3/22 2/22/22 2/8/22 2/3/22 2/1/22 1/28/2022 1/28/2022 1/24/2022 1/20/22 1/17/2022 1/5/22   Exercise              Treadmill X 5 min X 4 miin X 3 min X 3 min X 5 min X 5 min X 5 min X 5 min X 5 min X 5 min X  5min   Rotary Torso 90 seconds  36# to R 34# to L 32# to R 30# to L 28# to R 28#'s to L 26#'s to R 26#'s to L 24# to R 22#'s started L 20#'s started R   VISHAL           X 10   Prone Press ups           X 10   Standing Extensions           X 10   Supine SLR           X 10   All 4's cat/cow           X 5   Bird/dog           X 10   Seated hip flexion           X 10   Prone Plank           X 10   Prone Quad stretch           X 10    Mio pose           X 30 seconds   LTR           X 10     Supermans          X 10    Banded Deadlifts          X 10 2 purple bands    TA SLR          X 10    Reformer leg press        DL X 20 all springs DL x 20 all     Standing hip flexor stretch        X 30 seconds      Reformer 90/90 pulldowns        X 7 2R 1B      Seated pelvic tilt   X 10

## 2022-03-17 ENCOUNTER — HOSPITAL ENCOUNTER (OUTPATIENT)
Dept: PHYSICAL THERAPY | Facility: CLINIC | Age: 47
Discharge: HOME | End: 2022-03-17
Payer: OTHER MISCELLANEOUS

## 2022-03-17 DIAGNOSIS — M62.81 WEAKNESS OF TRUNK MUSCULATURE: Primary | ICD-10-CM

## 2022-03-17 DIAGNOSIS — M54.50 LUMBAR SPINE PAIN: ICD-10-CM

## 2022-03-17 DIAGNOSIS — M79.18 MYOFASCIAL PAIN: ICD-10-CM

## 2022-03-17 PROCEDURE — 97110 THERAPEUTIC EXERCISES: CPT | Mod: GP | Performed by: PHYSICAL THERAPIST

## 2022-03-17 PROCEDURE — 97140 MANUAL THERAPY 1/> REGIONS: CPT | Mod: GP | Performed by: PHYSICAL THERAPIST

## 2022-03-17 NOTE — PROGRESS NOTES
Daily Assessment:  Patient seen for 11th follow up since transfer to [facility] with lumbar radiculitis and disc herniation. PT performed re-test and he is demonstrating continued significant improvement since initiation of the MedX. See numbers below. Cristofer's BRENDA score has also improved from 66% to 36%. Cristofer has now returned to 8 hour work shifts and feels his improvement is promising.     Lumbar MedX 8-week Re-test  3/17/22 4-week Re-test  2/3/22 Initial testing    AROM (full=  0-72  lumbar) 0-48 0-48 6-33   Max Extension Torque  309# 252# 164#   Flex: ext ratio (ideal 1.4:1) 1.43:1 1.93:1 4.97:1         Date 3/17/22 3/3/22 2/22/22 2/8/22 2/3/22 2/1/22 1/28/2022 1/24/2022   1/20/22 1/17/2022 1/5/2022   Lumbar Parameters              Top Dead Center (TDC) 18 18 18 18 18 18 18 18 18 18 18   Counterbalance (CB) 577 577 577 577 577 577 577 577 577 577 577   Seat Pad 1 1 1 1 1 1 1 1 1 1 1   Femur Restraint 6 6 6 6 6 6 6 6 6 6 6   Week/Visit              Enter Week/Visit # 8 7 6 5 4 3/2 3/1 2/2 2/1 1/2 1/1   Weight (lbs) 309#max  95#ex 78# ex 76# ex 252# max  75 ex     252#Max  75# ex 74# 73# 73# 68# 64# 164# Max  80 ex dec to 60 ex   Reps (#) 13 35 19 16 11 12 16 13 21 20 20   Time 65 s 203 s 143 s 154 s 97 s 123s 138 134 s 153 s  144   ROM (degrees) 0-48 0-48 0-48 0-48 0-48 6-33 6-33 6-33 6-33 6-33 6-33   Pain        Fatigue and mild pain upper lumbar/lower thoracic      Flex:Ext ratio           4.97:1     Date 3/17/22 3/3/22 2/22/22 2/8/22 2/3/22 2/1/22 1/28/2022 1/28/2022 1/24/2022 1/20/22 1/17/2022 1/5/22   Exercise               Treadmill X 4 min X 5 min X 4 miin X 3 min X 3 min X 5 min X 5 min X 5 min X 5 min X 5 min X 5 min X  5min   Rotary Torso 90 seconds  40# to L 36# to R 34# to L 32# to R 30# to L 28# to R 28#'s to L 26#'s to R 26#'s to L 24# to R 22#'s started L 20#'s started R   VISHAL            X 10   Prone Press ups            X 10   Standing Extensions            X 10   Supine SLR            X 10   All 4's  cat/cow            X 5   Bird/dog            X 10   Seated hip flexion            X 10   Prone Plank            X 10   Prone Quad stretch            X 10   Mio pose            X 30 seconds   LTR            X 10     Supermans           X 10    Banded Deadlifts           X 10 2 purple bands    TA SLR           X 10    Reformer leg press         DL X 20 all springs DL x 20 all     Standing hip flexor stretch         X 30 seconds      Reformer 90/90 pulldowns         X 7 2R 1B      Seated pelvic tilt    X 10

## 2022-03-24 ENCOUNTER — TRANSFERRED RECORDS (OUTPATIENT)
Dept: HEALTH INFORMATION MANAGEMENT | Facility: CLINIC | Age: 47
End: 2022-03-24
Payer: COMMERCIAL

## 2022-03-25 ENCOUNTER — MYC MEDICAL ADVICE (OUTPATIENT)
Dept: PHYSICAL MEDICINE AND REHAB | Facility: CLINIC | Age: 47
End: 2022-03-25
Payer: COMMERCIAL

## 2022-04-01 ENCOUNTER — TRANSFERRED RECORDS (OUTPATIENT)
Dept: HEALTH INFORMATION MANAGEMENT | Facility: CLINIC | Age: 47
End: 2022-04-01
Payer: COMMERCIAL

## 2022-04-07 ENCOUNTER — OFFICE VISIT (OUTPATIENT)
Dept: PHYSICAL MEDICINE AND REHAB | Facility: CLINIC | Age: 47
End: 2022-04-07
Payer: OTHER MISCELLANEOUS

## 2022-04-07 VITALS — SYSTOLIC BLOOD PRESSURE: 133 MMHG | DIASTOLIC BLOOD PRESSURE: 79 MMHG | HEART RATE: 91 BPM

## 2022-04-07 DIAGNOSIS — M79.18 MYOFASCIAL PAIN: ICD-10-CM

## 2022-04-07 DIAGNOSIS — G47.9 SLEEP DIFFICULTIES: ICD-10-CM

## 2022-04-07 DIAGNOSIS — M54.50 LUMBAR SPINE PAIN: Primary | ICD-10-CM

## 2022-04-07 DIAGNOSIS — M43.28 FUSION OF SACRAL REGION OF SPINE: ICD-10-CM

## 2022-04-07 DIAGNOSIS — M51.9 ANNULAR TEAR: ICD-10-CM

## 2022-04-07 PROCEDURE — 99214 OFFICE O/P EST MOD 30 MIN: CPT | Performed by: PHYSICAL MEDICINE & REHABILITATION

## 2022-04-07 RX ORDER — CYCLOBENZAPRINE HCL 10 MG
5-10 TABLET ORAL 2 TIMES DAILY PRN
Qty: 60 TABLET | Refills: 1 | Status: SHIPPED | OUTPATIENT
Start: 2022-04-07

## 2022-04-07 ASSESSMENT — PAIN SCALES - GENERAL: PAINLEVEL: MILD PAIN (2)

## 2022-04-07 NOTE — PATIENT INSTRUCTIONS
1. Continue with Cyclobenzaprine as needed for myofascial pain. This medication may cause drowsiness. Please do not work or drive while taking this medication until you know how it effects you. If it does make you drowsy, you should only take it before bedtime or at times that you do not have to work/drive.        2. Advance work restrictions.     3. I would recommend 1-2 Osteopathic manipulations for 5-6 body regions in addition to the current physical therapy.

## 2022-04-07 NOTE — LETTER
April 7, 2022      Cristofer Gardneroswald  [address] [id number]        To Whom It May Concern:    Cristofer Del Cid was seen in our clinic. Left work early on 3/21/2022 due to pain.  He may return to work with the following: limited to light duty - lifting no greater than 30 pounds. 10 hour shifts. Allow to change positions for 15 minutes every hour as needed. Valid for 4 weeks.       Sincerely,        Roberto Mcmillan, DO

## 2022-04-07 NOTE — LETTER
4/7/2022         RE: Cristofer Del Cid  [address] [facility] [id number]        Dear Colleague,    Thank you for referring your patient, Cristofer Del Cid, to the [facility]. Please see a copy of my visit note below.    Assessment/Plan:      Cristofer was seen today for back pain.    Diagnoses and all orders for this visit:    Lumbar spine pain    Annular tear    Myofascial pain  -     cyclobenzaprine (FLEXERIL) 10 MG tablet; Take 0.5-1 tablets (5-10 mg) by mouth 2 times daily as needed for muscle spasms    Fusion of sacral region of spine    Sleep difficulties         Assessment: Pleasant 47 year old male with a history of gastroesophageal reflux controlled by diet and status post right sacroiliac joint fusion with:     1.    Ongoing improvement of lumbar spine pain after lifting a box of books at work on or around November 2 2021.  He has a Schmorl's node in the superior endplate of L5 with mild edema as well as a disc bulge and annular tear at L5-S1 both of which could be causing his severe subacute pain.  He also has significant left groin pain now which is described as a stiffness in the groin to the lateral hip but does radiate to the lumbar spine.  These findings are superimposed on degenerative disc disease throughout the lumbar spine.    He has hip osteoarthritis left greater than right likely contributing.  He has had some improvement from his initial presentation which he has reported up to 70-80%  with physical therapy and using medications.  He takes  meloxicam and cyclobenzaprine occasionally.  Had been using some Tylenol 3 or hydrocodone at bedtime on days that he works and no longer is needing this.  He has waxing waning of symptoms depending on his activity levels is working  with [employer] however is having a changing symptoms of the left groin and lateral hip has some neural tension signs with seated straight leg raise .  We have been able to increase his work  POST INJECTION EVALUATION, no signs of new infection, tear, RD, VF, EOM, CNS, Vascular or other problems or side effect from previous injection(s). hours to 8-hour shifts.  Upwards of 90% improvement from his initial visit still having right-sided low back pain at the lumbosacral junction.      2.  Left hip pain and anterior thigh pain.  Has some pain consistent with osteoarthritis which is greater on the left hip.  Also some myofascial pain.  Improved      3. Myofascial pain in the right lumbar spine.         Discussion:    1.  We discussed his ongoing improvement with work hardening.  His pain appears to be centralized into the right lumbar spine and doing well with work hardening.  We discussed options of ongoing medications, work restriction advancement.    2.  Continue cyclobenzaprine as needed.  Is only taking this at bedtime.  Will provide another prescription for him.  Taking this most nights.    3.  Continue with work hardening doing his home exercises.    4.  Advance work restrictions to 30 pound lift limit with 15 minutes every hour to change position as needed for comfort, and 10-hour shifts.  He did have to leave early on 1 day March 21 due to increased pain.    5.  I do believe Osteopathic manipulative medicine helpful in addition to current work hardening physical therapy.  I would recommend 1-2 sessions of Osteopathic manipulative medicine 6 body regions.  If he can get this approved through Worker's Compensation I believe that the combination of the 2 treatments would be quite helpful with the physical therapy.  This is due to the fact that he has had a right sacroiliac joint fusion.    6.  Follow-up 1 month      It was our pleasure caring for your patient today, if there any questions or concerns please do not hesitate to contact us.      Subjective:   Patient ID: Cristofer Del Cid is a 47 year old male.    History of Present Illness: Patient presents for follow-up of low back pain.  Doing work hardening.  Feels much better.  Up to 90% improvement since his initial visit.  Working 8-hour shifts with 15 pound lift limit without any issue had 1 day  where he had to leave early which was 21 March otherwise working through his 8 hours.  Working with work hardening and having pain in the right lower lumbar spine only worse with reaching standing or sitting better with walking and stretching pain is a 6/10 at worst 2/10 today 0/10 at best no current radiation down the legs paresthesias but his back feels weak.  Try to do MedX physical therapy along with work hardening and was unable to tolerate both.  Still taking Flexeril at night but only has a couple tablets remaining takes this most nights.      Imaging: MRI of the pelvis personally reviewed showing mild degenerative changes of the hips with no AVN.  Prior right SI fusion.    MRI lumbar spine images reviewed again today which shows multilevel degenerative changes with grade 1 retrolisthesis L5 on S1 mild central stenosis L3-4 focal disc protrusion L5-S1 with high intensity zone/annular tear.    Review of Systems: Pertinent positives: Complains of some weakness in the back..  Pertinent negatives: No numbness or tingling.  No bowel or bladder incontinence.  No urinary retention.  No fevers, unintentional weight loss, balance changes, headaches, frequent falling, difficulty swallowing, or coordination difficulties.  All others reviewed are negative.    Past Medical History:   Diagnosis Date     GERD (gastroesophageal reflux disease)        The following portions of the patient's history were reviewed and updated as appropriate: allergies, current medications, past family history, past medical history, past social history, past surgical history and problem list.           Objective:   Physical Exam:    /79   Pulse 91   There is no height or weight on file to calculate BMI.      General: Alert and oriented with normal affect. Attention, knowledge, memory, and language are intact. No acute distress.     Respirations: Unlabored. CV: No lower extremity edema.     Gait:  Nonantalgic, cautious.  Mild to moderate  decreased lumbar flexion finger to floor testing.  Tenderness right lumbar paraspinals with hypertonic tissue textures at L4-5 L5-S1.  Sensation is intact to light touch throughout the  lower extremities.  Reflexes are  2+ patellar and trace right, 2+ left Achilles      Manual muscle testing reveals:  Right /Left out of 5     5/5 knee flexors  5/5 knee extensors  5/5 ankle plantar flexors  5/5 ankle dorsiflexors  5/5  ankle evertors         Again, thank you for allowing me to participate in the care of your patient.        Sincerely,        Roberto Mcmillan, DO

## 2022-04-07 NOTE — PROGRESS NOTES
Assessment/Plan:      Cristofer was seen today for back pain.    Diagnoses and all orders for this visit:    Lumbar spine pain    Annular tear    Myofascial pain  -     cyclobenzaprine (FLEXERIL) 10 MG tablet; Take 0.5-1 tablets (5-10 mg) by mouth 2 times daily as needed for muscle spasms    Fusion of sacral region of spine    Sleep difficulties         Assessment: Pleasant 47 year old male with a history of gastroesophageal reflux controlled by diet and status post right sacroiliac joint fusion with:     1.    Ongoing improvement of lumbar spine pain after lifting a box of books at work on or around November 2 2021.  He has a Schmorl's node in the superior endplate of L5 with mild edema as well as a disc bulge and annular tear at L5-S1 both of which could be causing his severe subacute pain.  He also has significant left groin pain now which is described as a stiffness in the groin to the lateral hip but does radiate to the lumbar spine.  These findings are superimposed on degenerative disc disease throughout the lumbar spine.    He has hip osteoarthritis left greater than right likely contributing.  He has had some improvement from his initial presentation which he has reported up to 70-80%  with physical therapy and using medications.  He takes  meloxicam and cyclobenzaprine occasionally.  Had been using some Tylenol 3 or hydrocodone at bedtime on days that he works and no longer is needing this.  He has waxing waning of symptoms depending on his activity levels is working  with [employer] however is having a changing symptoms of the left groin and lateral hip has some neural tension signs with seated straight leg raise .  We have been able to increase his work hours to 8-hour shifts.  Upwards of 90% improvement from his initial visit still having right-sided low back pain at the lumbosacral junction.      2.  Left hip pain and anterior thigh pain.  Has some pain consistent with osteoarthritis which is greater on the left  hip.  Also some myofascial pain.  Improved      3. Myofascial pain in the right lumbar spine.         Discussion:    1.  We discussed his ongoing improvement with work hardening.  His pain appears to be centralized into the right lumbar spine and doing well with work hardening.  We discussed options of ongoing medications, work restriction advancement.    2.  Continue cyclobenzaprine as needed.  Is only taking this at bedtime.  Will provide another prescription for him.  Taking this most nights.    3.  Continue with work hardening doing his home exercises.    4.  Advance work restrictions to 30 pound lift limit with 15 minutes every hour to change position as needed for comfort, and 10-hour shifts.  He did have to leave early on 1 day March 21 due to increased pain.    5.  I do believe Osteopathic manipulative medicine helpful in addition to current work hardening physical therapy.  I would recommend 1-2 sessions of Osteopathic manipulative medicine 6 body regions.  If he can get this approved through Worker's Compensation I believe that the combination of the 2 treatments would be quite helpful with the physical therapy.  This is due to the fact that he has had a right sacroiliac joint fusion.    6.  Follow-up 1 month      It was our pleasure caring for your patient today, if there any questions or concerns please do not hesitate to contact us.      Subjective:   Patient ID: Cristofer Del Cid is a 47 year old male.    History of Present Illness: Patient presents for follow-up of low back pain.  Doing work hardening.  Feels much better.  Up to 90% improvement since his initial visit.  Working 8-hour shifts with 15 pound lift limit without any issue had 1 day where he had to leave early which was 21 March otherwise working through his 8 hours.  Working with work hardening and having pain in the right lower lumbar spine only worse with reaching standing or sitting better with walking and stretching pain is a 6/10 at worst  2/10 today 0/10 at best no current radiation down the legs paresthesias but his back feels weak.  Try to do MedX physical therapy along with work hardening and was unable to tolerate both.  Still taking Flexeril at night but only has a couple tablets remaining takes this most nights.      Imaging: MRI of the pelvis personally reviewed showing mild degenerative changes of the hips with no AVN.  Prior right SI fusion.    MRI lumbar spine images reviewed again today which shows multilevel degenerative changes with grade 1 retrolisthesis L5 on S1 mild central stenosis L3-4 focal disc protrusion L5-S1 with high intensity zone/annular tear.    Review of Systems: Pertinent positives: Complains of some weakness in the back..  Pertinent negatives: No numbness or tingling.  No bowel or bladder incontinence.  No urinary retention.  No fevers, unintentional weight loss, balance changes, headaches, frequent falling, difficulty swallowing, or coordination difficulties.  All others reviewed are negative.    Past Medical History:   Diagnosis Date     GERD (gastroesophageal reflux disease)        The following portions of the patient's history were reviewed and updated as appropriate: allergies, current medications, past family history, past medical history, past social history, past surgical history and problem list.           Objective:   Physical Exam:    /79   Pulse 91   There is no height or weight on file to calculate BMI.      General: Alert and oriented with normal affect. Attention, knowledge, memory, and language are intact. No acute distress.     Respirations: Unlabored. CV: No lower extremity edema.     Gait:  Nonantalgic, cautious.  Mild to moderate decreased lumbar flexion finger to floor testing.  Tenderness right lumbar paraspinals with hypertonic tissue textures at L4-5 L5-S1.  Sensation is intact to light touch throughout the  lower extremities.  Reflexes are  2+ patellar and trace right, 2+ left Achilles       Manual muscle testing reveals:  Right /Left out of 5     5/5 knee flexors  5/5 knee extensors  5/5 ankle plantar flexors  5/5 ankle dorsiflexors  5/5  ankle evertors

## 2022-04-08 ENCOUNTER — TRANSFERRED RECORDS (OUTPATIENT)
Dept: HEALTH INFORMATION MANAGEMENT | Facility: CLINIC | Age: 47
End: 2022-04-08
Payer: COMMERCIAL

## 2022-04-22 ENCOUNTER — TRANSFERRED RECORDS (OUTPATIENT)
Dept: HEALTH INFORMATION MANAGEMENT | Facility: CLINIC | Age: 47
End: 2022-04-22
Payer: COMMERCIAL

## 2022-04-29 ENCOUNTER — TRANSFERRED RECORDS (OUTPATIENT)
Dept: HEALTH INFORMATION MANAGEMENT | Facility: CLINIC | Age: 47
End: 2022-04-29
Payer: COMMERCIAL

## 2022-05-06 ENCOUNTER — OFFICE VISIT (OUTPATIENT)
Dept: PHYSICAL MEDICINE AND REHAB | Facility: CLINIC | Age: 47
End: 2022-05-06
Payer: OTHER MISCELLANEOUS

## 2022-05-06 VITALS — DIASTOLIC BLOOD PRESSURE: 65 MMHG | SYSTOLIC BLOOD PRESSURE: 126 MMHG | HEART RATE: 69 BPM

## 2022-05-06 DIAGNOSIS — M79.18 MYOFASCIAL PAIN: ICD-10-CM

## 2022-05-06 DIAGNOSIS — M99.03 SOMATIC DYSFUNCTION OF LUMBAR REGION: ICD-10-CM

## 2022-05-06 DIAGNOSIS — M43.28 FUSION OF SACRAL REGION OF SPINE: ICD-10-CM

## 2022-05-06 DIAGNOSIS — M99.04 SOMATIC DYSFUNCTION OF SACROILIAC JOINT: ICD-10-CM

## 2022-05-06 DIAGNOSIS — M51.9 ANNULAR TEAR: ICD-10-CM

## 2022-05-06 DIAGNOSIS — M99.05 SOMATIC DYSFUNCTION OF PELVIS REGION: ICD-10-CM

## 2022-05-06 DIAGNOSIS — M99.02 SOMATIC DYSFUNCTION OF THORACIC REGION: ICD-10-CM

## 2022-05-06 DIAGNOSIS — M54.50 LUMBAR SPINE PAIN: Primary | ICD-10-CM

## 2022-05-06 DIAGNOSIS — M99.06 SOMATIC DYSFUNCTION OF LOWER EXTREMITY: ICD-10-CM

## 2022-05-06 PROCEDURE — 99213 OFFICE O/P EST LOW 20 MIN: CPT | Mod: 25 | Performed by: PHYSICAL MEDICINE & REHABILITATION

## 2022-05-06 PROCEDURE — 98927 OSTEOPATH MANJ 5-6 REGIONS: CPT | Performed by: PHYSICAL MEDICINE & REHABILITATION

## 2022-05-06 ASSESSMENT — PAIN SCALES - GENERAL: PAINLEVEL: MILD PAIN (3)

## 2022-05-06 NOTE — LETTER
May 6, 2022      Cristofer Del Cid  [address] [place] [id number]        To Whom It May Concern:    Cristofer Del Cid  was seen on 5/6/2022.  Please excuse him  until 5/7/2022 due to medical issues.        Sincerely,        Roberto Mcmillan, DO

## 2022-05-06 NOTE — LETTER
5/6/2022         RE: Cristofer Del Cid  [address] [facility] [id number]        Dear Colleague,    Thank you for referring your patient, Cristofer Del Cid, to the [facility]. Please see a copy of my visit note below.    Assessment/Plan:      Cristofer was seen today for back pain.    Diagnoses and all orders for this visit:    Lumbar spine pain    Annular tear    Fusion of sacral region of spine    Myofascial pain    Somatic dysfunction of lumbar region  -     OSTEOPATHIC MANIP,5-6 BODY REGN    Somatic dysfunction of thoracic region  -     OSTEOPATHIC MANIP,5-6 BODY REGN    Somatic dysfunction of sacroiliac joint  -     OSTEOPATHIC MANIP,5-6 BODY REGN    Somatic dysfunction of pelvis region  -     OSTEOPATHIC MANIP,5-6 BODY REGN    Somatic dysfunction of lower extremity  -     OSTEOPATHIC MANIP,5-6 BODY REGN         Assessment: Pleasant 47 year old male  with a history of gastroesophageal reflux controlled by diet and status post right sacroiliac joint fusion with:     1.    Ongoing improvement of lumbar spine pain after lifting a box of books at work on or around November 2 2021.  He has a Schmorl's node in the superior endplate of L5 with mild edema as well as a disc bulge and annular tear at L5-S1 both of which could be causing his severe subacute pain.  He also has significant left groin pain now which is described as a stiffness in the groin to the lateral hip but does radiate to the lumbar spine.  These findings are superimposed on degenerative disc disease throughout the lumbar spine.    He has hip osteoarthritis left greater than right likely contributing.  He has had dramatic improvement from his initial presentation which he has reported over 80% with physical therapy and using medications.  He takes naproxen and cyclobenzaprine occasionally.  Continues to have  waxing waning of symptoms depending on his activity levels is working with [employer]/work hardening.  We have  been able to increase his work restrictions to 30 pound lift limit for 10-hour shifts.   Continues to have improvement overall with work hardening        2.  Left hip pain and anterior thigh pain.  Has some pain consistent with osteoarthritis which is greater on the left hip.  Also some myofascial pain.    Resolved     3. Myofascial pain in the right lumbar spine.      4.   somatic dysfunctions of the  thoracic spine, lumbar spine, sacrum, pelvis, lower extremities that contribute to the patient's pain complaints.    Discussion:    1. *We discussed his progress thus far.  He is continuing to make improvement in his symptoms still having some stiffness at times specially with mild flexion of the lumbar spine after doing heavy activity such as lifting or running at work hardening but improving.  We discussed options of Osteopathic manipulative medicine along with discussing his work restrictions today and he would like to continue to advance his work restrictions.    2.  Recommend Osteopathic manipulative medicine I will today 5-6 body areas to see if this will help improve his symptoms.  He agrees to proceed.  Please see attached procedure note.  Following the procedure he did have some increased stiffness in the low back lumbosacral junction which is not worse than he has had following lifting weights at work hardening but feels that he might be limited with his activity for the rest of the day which is likely given the duration of his symptoms.    2.  We will allow him to take today off work and return to work tomorrow.    3.  He can continue with plan of work hardening therapy and see how he tolerates this today and would recommend another up to 2 weeks of therapy based on his progress we will leave this at the discretion of the therapist if he is still seeing improvements.    4.  Can continue with medications of cyclobenzaprine and naproxen.    5.  Follow-up 1 to 2 weeks to see how he does with OMT.  If he has  significant flare we will hold off on further treatments if he has improvements with OMT then can consider additional treatment.    It was our pleasure caring for your patient today, if there any questions or concerns please do not hesitate to contact us.           Subjective:   Patient ID: Cristofer Del Cid is a 47 year old male.    History of Present Illness: Patient presents for follow-up of low back pain.  Is waxing waning of back pain lumbosacral junction up into the mid lumbar spine on the right.  Symptoms have improved with work hardening physical therapy.  Continues to have improvement overall.  Had a flare of his pain or tightness which she describes to the low back after work hardening running on a treadmill which is more tolerated did heavy lifting.  Following that bent over in the bathroom to wash his hands and had increased stiffness in his back but overall managing with occasional cyclobenzaprine and naproxen.  Pain is worse with sitting or standing better with walking or moving.  Pain is a 6/10 at worst 3/10 today 1/10 at best.  No further radiation down the legs paresthesias or weakness.  Since last visit work restrictions advanced to 30 pound lift limit for 10-hour shifts.  This was over my chart message for patient.  Tolerating these restrictions well.  Feels they can be advanced.  Has been approved for Osteopathic manipulative medicine.  Is interested in trying treatment today    Imaging:MRI lumbar spine images reviewed again today from November 2021 revealing central disc bulge with annular tear L5-S1.  Schmorl's node superior endplate of L5 with potential for edema on my review.  Mild degenerative changes L3-4 L4-5.  No central stenosis of significance prior right SI fusion.  No lateral recess stenosis of significance.    Review of Systems: Pertinent positives: None.  Pertinent negatives: No numbness, tingling or weakness.  No bowel or bladder incontinence.  No urinary retention.  No fevers,  unintentional weight loss, balance changes, headaches, frequent falling, difficulty swallowing, or coordination difficulties.  All others reviewed are negative.    Past Medical History:   Diagnosis Date     GERD (gastroesophageal reflux disease)        The following portions of the patient's history were reviewed and updated as appropriate: allergies, current medications, past family history, past medical history, past social history, past surgical history and problem list.           Objective:   Physical Exam:    /65   Pulse 69   There is no height or weight on file to calculate BMI.      General: Alert and oriented with normal affect. Attention, knowledge, memory, and language are intact. No acute distress.   Eyes: Sclerae are clear.  Respirations: Unlabored. CV: No lower extremity edema.  Skin: No rashes seen.    Gait:  Nonantalgic    Sensation is intact to light touch throughout the  lower extremities.  Reflexes are  2+ patellar and Achilles      Manual muscle testing reveals:  Right /Left out of 5     5/5 hip flexors  5/5 knee flexors  5/5 knee extensors  5/5 ankle plantar flexors  5/5 ankle dorsiflexors  5/5  EHL       Structural exam:  Thoracic spine: T12 rotated left sidebent left. Lumbar spine: L5 rotated right sidebent left. Pelvis: Right innominate upslip, right innominate in flare, anterior inferior right innominate. Sacrum: Left unilateral sacral shear. Lower extremity: Hypertonic hip flexors right greater than left and knee extensors  ,       Procedure:    After discussing the risks and benefits of osteopathic manipulative medicine, verbal consent was obtained. The somatic dysfunctions listed above were treated with the following techniques:  Thoracic spine: Lateral recumbent muscle energy articulation achieved..  Lumbar spine: Soft tissue and lateral recumbent muscle energy. Pelvis: Still technique, muscle energy, gentle leg tugs and respiratory technique and Isometrics. Sacrum: Myofascial  release.  Lower extremities: Muscle energy.     The patient tolerated the procedure well and had improved range of motion in all areas treated prior to leaving the clinic.  He did have increased stiffness in the low back following manipulation.  Reassurance provided that this is expected after treatment.        Again, thank you for allowing me to participate in the care of your patient.        Sincerely,        Roberto Mcmillan, DO

## 2022-05-06 NOTE — PROGRESS NOTES
Assessment/Plan:      Cristofer was seen today for back pain.    Diagnoses and all orders for this visit:    Lumbar spine pain    Annular tear    Fusion of sacral region of spine    Myofascial pain    Somatic dysfunction of lumbar region  -     OSTEOPATHIC MANIP,5-6 BODY REGN    Somatic dysfunction of thoracic region  -     OSTEOPATHIC MANIP,5-6 BODY REGN    Somatic dysfunction of sacroiliac joint  -     OSTEOPATHIC MANIP,5-6 BODY REGN    Somatic dysfunction of pelvis region  -     OSTEOPATHIC MANIP,5-6 BODY REGN    Somatic dysfunction of lower extremity  -     OSTEOPATHIC MANIP,5-6 BODY REGN         Assessment: Pleasant 47 year old male  with a history of gastroesophageal reflux controlled by diet and status post right sacroiliac joint fusion with:     1.    Ongoing improvement of lumbar spine pain after lifting a box of books at work on or around November 2 2021.  He has a Schmorl's node in the superior endplate of L5 with mild edema as well as a disc bulge and annular tear at L5-S1 both of which could be causing his severe subacute pain.  He also has significant left groin pain now which is described as a stiffness in the groin to the lateral hip but does radiate to the lumbar spine.  These findings are superimposed on degenerative disc disease throughout the lumbar spine.    He has hip osteoarthritis left greater than right likely contributing.  He has had dramatic improvement from his initial presentation which he has reported over 80% with physical therapy and using medications.  He takes naproxen and cyclobenzaprine occasionally.  Continues to have  waxing waning of symptoms depending on his activity levels is working with [employer]/work hardening.  We have been able to increase his work restrictions to 30 pound lift limit for 10-hour shifts.   Continues to have improvement overall with work hardening        2.  Left hip pain and anterior thigh pain.  Has some pain consistent with osteoarthritis which is greater on  the left hip.  Also some myofascial pain.    Resolved     3. Myofascial pain in the right lumbar spine.      4.   somatic dysfunctions of the  thoracic spine, lumbar spine, sacrum, pelvis, lower extremities that contribute to the patient's pain complaints.    Discussion:    1. *We discussed his progress thus far.  He is continuing to make improvement in his symptoms still having some stiffness at times specially with mild flexion of the lumbar spine after doing heavy activity such as lifting or running at work hardening but improving.  We discussed options of Osteopathic manipulative medicine along with discussing his work restrictions today and he would like to continue to advance his work restrictions.    2.  Recommend Osteopathic manipulative medicine I will today 5-6 body areas to see if this will help improve his symptoms.  He agrees to proceed.  Please see attached procedure note.  Following the procedure he did have some increased stiffness in the low back lumbosacral junction which is not worse than he has had following lifting weights at work hardening but feels that he might be limited with his activity for the rest of the day which is likely given the duration of his symptoms.    2.  We will allow him to take today off work and return to work tomorrow.    3.  He can continue with plan of work hardening therapy and see how he tolerates this today and would recommend another up to 2 weeks of therapy based on his progress we will leave this at the discretion of the therapist if he is still seeing improvements.    4.  Can continue with medications of cyclobenzaprine and naproxen.    5.  Follow-up 1 to 2 weeks to see how he does with OMT.  If he has significant flare we will hold off on further treatments if he has improvements with OMT then can consider additional treatment.    It was our pleasure caring for your patient today, if there any questions or concerns please do not hesitate to contact us.            Subjective:   Patient ID: Cristofer Del Cid is a 47 year old male.    History of Present Illness: Patient presents for follow-up of low back pain.  Is waxing waning of back pain lumbosacral junction up into the mid lumbar spine on the right.  Symptoms have improved with work hardening physical therapy.  Continues to have improvement overall.  Had a flare of his pain or tightness which she describes to the low back after work hardening running on a treadmill which is more tolerated did heavy lifting.  Following that bent over in the bathroom to wash his hands and had increased stiffness in his back but overall managing with occasional cyclobenzaprine and naproxen.  Pain is worse with sitting or standing better with walking or moving.  Pain is a 6/10 at worst 3/10 today 1/10 at best.  No further radiation down the legs paresthesias or weakness.  Since last visit work restrictions advanced to 30 pound lift limit for 10-hour shifts.  This was over my chart message for patient.  Tolerating these restrictions well.  Feels they can be advanced.  Has been approved for Osteopathic manipulative medicine.  Is interested in trying treatment today    Imaging:MRI lumbar spine images reviewed again today from November 2021 revealing central disc bulge with annular tear L5-S1.  Schmorl's node superior endplate of L5 with potential for edema on my review.  Mild degenerative changes L3-4 L4-5.  No central stenosis of significance prior right SI fusion.  No lateral recess stenosis of significance.    Review of Systems: Pertinent positives: None.  Pertinent negatives: No numbness, tingling or weakness.  No bowel or bladder incontinence.  No urinary retention.  No fevers, unintentional weight loss, balance changes, headaches, frequent falling, difficulty swallowing, or coordination difficulties.  All others reviewed are negative.    Past Medical History:   Diagnosis Date     GERD (gastroesophageal reflux disease)        The  following portions of the patient's history were reviewed and updated as appropriate: allergies, current medications, past family history, past medical history, past social history, past surgical history and problem list.           Objective:   Physical Exam:    /65   Pulse 69   There is no height or weight on file to calculate BMI.      General: Alert and oriented with normal affect. Attention, knowledge, memory, and language are intact. No acute distress.   Eyes: Sclerae are clear.  Respirations: Unlabored. CV: No lower extremity edema.  Skin: No rashes seen.    Gait:  Nonantalgic    Sensation is intact to light touch throughout the  lower extremities.  Reflexes are  2+ patellar and Achilles      Manual muscle testing reveals:  Right /Left out of 5     5/5 hip flexors  5/5 knee flexors  5/5 knee extensors  5/5 ankle plantar flexors  5/5 ankle dorsiflexors  5/5  EHL       Structural exam:  Thoracic spine: T12 rotated left sidebent left. Lumbar spine: L5 rotated right sidebent left. Pelvis: Right innominate upslip, right innominate in flare, anterior inferior right innominate. Sacrum: Left unilateral sacral shear. Lower extremity: Hypertonic hip flexors right greater than left and knee extensors  ,       Procedure:    After discussing the risks and benefits of osteopathic manipulative medicine, verbal consent was obtained. The somatic dysfunctions listed above were treated with the following techniques:  Thoracic spine: Lateral recumbent muscle energy articulation achieved..  Lumbar spine: Soft tissue and lateral recumbent muscle energy. Pelvis: Still technique, muscle energy, gentle leg tugs and respiratory technique and Isometrics. Sacrum: Myofascial release.  Lower extremities: Muscle energy.     The patient tolerated the procedure well and had improved range of motion in all areas treated prior to leaving the clinic.  He did have increased stiffness in the low back following manipulation.  Reassurance  provided that this is expected after treatment.

## 2022-05-06 NOTE — PATIENT INSTRUCTIONS
Continue/complete physical therapy  2. Osteopathic manual medicine today  3. Advance work restrictions to 40lb lift limit.

## 2022-05-06 NOTE — LETTER
May 6, 2022      Cristofer Del Cid  [address] [facility] [address]        To Whom It May Concern:    Cristofer TRISTEN Del Cid was seen in our clinic. He may return to work with the following: limited to light duty - lifting no greater than 40 pounds for 1 month until follow up.      Sincerely,        Roberto Mcmillan, DO

## 2022-05-12 ENCOUNTER — OFFICE VISIT (OUTPATIENT)
Dept: PHYSICAL MEDICINE AND REHAB | Facility: CLINIC | Age: 47
End: 2022-05-12
Payer: OTHER MISCELLANEOUS

## 2022-05-12 VITALS — SYSTOLIC BLOOD PRESSURE: 118 MMHG | DIASTOLIC BLOOD PRESSURE: 68 MMHG | HEART RATE: 85 BPM

## 2022-05-12 DIAGNOSIS — M99.06 SOMATIC DYSFUNCTION OF LOWER EXTREMITY: ICD-10-CM

## 2022-05-12 DIAGNOSIS — M99.05 SOMATIC DYSFUNCTION OF PELVIS REGION: ICD-10-CM

## 2022-05-12 DIAGNOSIS — M43.28 FUSION OF SACRAL REGION OF SPINE: ICD-10-CM

## 2022-05-12 DIAGNOSIS — M99.03 SOMATIC DYSFUNCTION OF LUMBAR REGION: ICD-10-CM

## 2022-05-12 DIAGNOSIS — M79.18 MYOFASCIAL PAIN: ICD-10-CM

## 2022-05-12 DIAGNOSIS — M99.02 SOMATIC DYSFUNCTION OF THORACIC REGION: ICD-10-CM

## 2022-05-12 DIAGNOSIS — M99.04 SOMATIC DYSFUNCTION OF SACROILIAC JOINT: ICD-10-CM

## 2022-05-12 DIAGNOSIS — M54.50 LUMBAR SPINE PAIN: Primary | ICD-10-CM

## 2022-05-12 PROCEDURE — 98927 OSTEOPATH MANJ 5-6 REGIONS: CPT | Performed by: PHYSICAL MEDICINE & REHABILITATION

## 2022-05-12 ASSESSMENT — PAIN SCALES - GENERAL: PAINLEVEL: MODERATE PAIN (4)

## 2022-05-12 NOTE — PROGRESS NOTES
Assessment/Plan:      Cristofer was seen today for back pain.    Diagnoses and all orders for this visit:    Lumbar spine pain    Fusion of sacral region of spine    Myofascial pain    Somatic dysfunction of thoracic region  -     OSTEOPATHIC MANIP,5-6 BODY REGN    Somatic dysfunction of lumbar region  -     OSTEOPATHIC MANIP,5-6 BODY REGN    Somatic dysfunction of sacroiliac joint  -     OSTEOPATHIC MANIP,5-6 BODY REGN    Somatic dysfunction of lower extremity  -     OSTEOPATHIC MANIP,5-6 BODY REGN    Somatic dysfunction of pelvis region  -     OSTEOPATHIC MANIP,5-6 BODY REGN         Assessment: Pleasant 47 year old male with a history of gastroesophageal reflux controlled by diet and status post right sacroiliac joint fusion with:     1.  Persistent lumbar spine pain after lifting a box of books at work on or around November 2 2021.  He has a Schmorl's node in the superior endplate of L5 with mild edema as well as a disc bulge and annular tear at L5-S1 both of which could be causing his severe subacute pain.  He also has significant left groin pain now which is described as a stiffness in the groin to the lateral hip but does radiate to the lumbar spine.  These findings are superimposed on degenerative disc disease throughout the lumbar spine.    He has hip osteoarthritis left greater than right likely contributing.  He has had dramatic improvement from his initial presentation which he has reported over 80% with physical therapy and using medications.  He takes naproxen and cyclobenzaprine occasionally.  Continues to have  waxing waning of symptoms depending on his activity levels is working with [employer]/work hardening.  We have been able to increase his work restrictions to 30 pound lift limit for 10-hour shifts.   Continues to have improvement overall with work hardening, initial flare following Osteopathic manipulative medicine but has had potential some mild improvement or at least pain is back to  baseline.        2.  Left hip pain and anterior thigh pain.  Has some pain consistent with osteoarthritis which is greater on the left hip.  Also some myofascial pain.    Resolved     3. Myofascial pain in the right lumbar spine.        4.   somatic dysfunctions of the  thoracic spine, lumbar spine, sacrum, pelvis, lower extremities that contribute to the patient's pain complaints.    Discussion:    1.  Patient presents for second Osteopathic manipulative medicine today.  Recommend proceeding with treatment.  Would recommend little less aggressive treatment more with indirect techniques which were done today he agrees to proceed.  Please see attached procedure note.  He had improved symptoms after manipulation today.    2.  Follow-up 1 week to evaluate his progress.  Continue with home exercises and PT.      It was our pleasure caring for your patient today, if there any questions or concerns please do not hesitate to contact us.      Subjective:   Patient ID: Cristofer Del Cid is a 47 year old male.    History of Present Illness: Patient presents for follow-up of lumbar spine pain and Osteopathic manipulative medicine had 2 to 3 days of soreness after OMT and then the pain returned to baseline may be slightly improved.  Has had increased pain after physical therapy today they did some nerve testing/neural tension testing has some pain down the right hamstring today.  Still has right-sided greater than left low back pain rated 8/10 at worst 4/10 today 2/10 at best.  Feels that he is making progress would like to try additional treatment as previously discussed.      Review of Systems: Denies numbness tingling weakness in the legs today.  Past Medical History:   Diagnosis Date     GERD (gastroesophageal reflux disease)        The following portions of the patient's history were reviewed and updated as appropriate: allergies, current medications, past family history, past medical history, past social history, past  surgical history and problem list.           Objective:   Physical Exam:    /68   Pulse 85   There is no height or weight on file to calculate BMI.      General: Alert and oriented with normal affect. Attention, knowledge, memory, and language are intact. No acute distress.   Eyes: Sclerae are clear.  Respirations: Unlabored.   Gait: Cautious, nonantalgic.  Tenderness over the right PSIS and sacrum as well as right lumbar paraspinals greater than left.    Sensation is intact to light touch throughout the   lower extremities.     Manual muscle testing reveals:  Right /Left out of 5     5/5 hip flexors  5/5 knee flexors  5/5 knee extensors  5/5 ankle plantar flexors  5/5 ankle dorsiflexors  5/5    ankle evertors    Structural exam:  Thoracic spine: T1 rotated right sidebent right, T12 rotated left sidebent left. Lumbar spine: L5 rotated right sidebent left. Pelvis: Left innominate upslip, anterior inferior right innominate. Sacrum: Left unilateral sacral shear. Lower extremity: Hypertonic hip flexors and quadriceps bilaterally.    Procedure:    After discussing the risks and benefits of osteopathic manipulative medicine, verbal consent was obtained. The somatic dysfunctions listed above were treated with the following techniques:   Thoracic spine: Myofascial release .  Lumbar spine: Myofascial release technique .  And still technique pelvis: Still technique and Isometrics. Sacrum: Myofascial release.  Lower extremities: Muscle energy.   The patient tolerated the procedure well and had improved range of motion in all areas treated prior to leaving the clinic.

## 2022-05-12 NOTE — LETTER
5/12/2022         RE: Cristofer Del Cid  [address] [facility] [id number]        Dear Colleague,    Thank you for referring your patient, Cristofer Del Cid, to the [facility]UR[facility]ERY. Please see a copy of my visit note below.    Assessment/Plan:      Cristofer was seen today for back pain.    Diagnoses and all orders for this visit:    Lumbar spine pain    Fusion of sacral region of spine    Myofascial pain    Somatic dysfunction of thoracic region  -     OSTEOPATHIC MANIP,5-6 BODY REGN    Somatic dysfunction of lumbar region  -     OSTEOPATHIC MANIP,5-6 BODY REGN    Somatic dysfunction of sacroiliac joint  -     OSTEOPATHIC MANIP,5-6 BODY REGN    Somatic dysfunction of lower extremity  -     OSTEOPATHIC MANIP,5-6 BODY REGN    Somatic dysfunction of pelvis region  -     OSTEOPATHIC MANIP,5-6 BODY REGN         Assessment: Pleasant 47 year old male with a history of gastroesophageal reflux controlled by diet and status post right sacroiliac joint fusion with:     1.  Persistent lumbar spine pain after lifting a box of books at work on or around November 2 2021.  He has a Schmorl's node in the superior endplate of L5 with mild edema as well as a disc bulge and annular tear at L5-S1 both of which could be causing his severe subacute pain.  He also has significant left groin pain now which is described as a stiffness in the groin to the lateral hip but does radiate to the lumbar spine.  These findings are superimposed on degenerative disc disease throughout the lumbar spine.    He has hip osteoarthritis left greater than right likely contributing.  He has had dramatic improvement from his initial presentation which he has reported over 80% with physical therapy and using medications.  He takes naproxen and cyclobenzaprine occasionally.  Continues to have  waxing waning of symptoms depending on his activity levels is working with [employer]/work hardening.  We have been able to increase his work  restrictions to 30 pound lift limit for 10-hour shifts.   Continues to have improvement overall with work hardening, initial flare following Osteopathic manipulative medicine but has had potential some mild improvement or at least pain is back to baseline.        2.  Left hip pain and anterior thigh pain.  Has some pain consistent with osteoarthritis which is greater on the left hip.  Also some myofascial pain.    Resolved     3. Myofascial pain in the right lumbar spine.        4.   somatic dysfunctions of the  thoracic spine, lumbar spine, sacrum, pelvis, lower extremities that contribute to the patient's pain complaints.    Discussion:    1.  Patient presents for second Osteopathic manipulative medicine today.  Recommend proceeding with treatment.  Would recommend little less aggressive treatment more with indirect techniques which were done today he agrees to proceed.  Please see attached procedure note.  He had improved symptoms after manipulation today.    2.  Follow-up 1 week to evaluate his progress.  Continue with home exercises and PT.      It was our pleasure caring for your patient today, if there any questions or concerns please do not hesitate to contact us.      Subjective:   Patient ID: Cristofer Del Cid is a 47 year old male.    History of Present Illness: Patient presents for follow-up of lumbar spine pain and Osteopathic manipulative medicine had 2 to 3 days of soreness after OMT and then the pain returned to baseline may be slightly improved.  Has had increased pain after physical therapy today they did some nerve testing/neural tension testing has some pain down the right hamstring today.  Still has right-sided greater than left low back pain rated 8/10 at worst 4/10 today 2/10 at best.  Feels that he is making progress would like to try additional treatment as previously discussed.      Review of Systems: Denies numbness tingling weakness in the legs today.  Past Medical History:   Diagnosis Date      GERD (gastroesophageal reflux disease)        The following portions of the patient's history were reviewed and updated as appropriate: allergies, current medications, past family history, past medical history, past social history, past surgical history and problem list.           Objective:   Physical Exam:    /68   Pulse 85   There is no height or weight on file to calculate BMI.      General: Alert and oriented with normal affect. Attention, knowledge, memory, and language are intact. No acute distress.   Eyes: Sclerae are clear.  Respirations: Unlabored.   Gait: Cautious, nonantalgic.  Tenderness over the right PSIS and sacrum as well as right lumbar paraspinals greater than left.    Sensation is intact to light touch throughout the   lower extremities.     Manual muscle testing reveals:  Right /Left out of 5     5/5 hip flexors  5/5 knee flexors  5/5 knee extensors  5/5 ankle plantar flexors  5/5 ankle dorsiflexors  5/5    ankle evertors    Structural exam:  Thoracic spine: T1 rotated right sidebent right, T12 rotated left sidebent left. Lumbar spine: L5 rotated right sidebent left. Pelvis: Left innominate upslip, anterior inferior right innominate. Sacrum: Left unilateral sacral shear. Lower extremity: Hypertonic hip flexors and quadriceps bilaterally.    Procedure:    After discussing the risks and benefits of osteopathic manipulative medicine, verbal consent was obtained. The somatic dysfunctions listed above were treated with the following techniques:   Thoracic spine: Myofascial release .  Lumbar spine: Myofascial release technique .  And still technique pelvis: Still technique and Isometrics. Sacrum: Myofascial release.  Lower extremities: Muscle energy.   The patient tolerated the procedure well and had improved range of motion in all areas treated prior to leaving the clinic.        Again, thank you for allowing me to participate in the care of your patient.        Sincerely,        Roberto  Hipolito, DO

## 2022-05-20 ENCOUNTER — OFFICE VISIT (OUTPATIENT)
Dept: PHYSICAL MEDICINE AND REHAB | Facility: CLINIC | Age: 47
End: 2022-05-20
Payer: OTHER MISCELLANEOUS

## 2022-05-20 VITALS — HEART RATE: 79 BPM | DIASTOLIC BLOOD PRESSURE: 72 MMHG | SYSTOLIC BLOOD PRESSURE: 138 MMHG

## 2022-05-20 DIAGNOSIS — M54.50 LUMBAR SPINE PAIN: Primary | ICD-10-CM

## 2022-05-20 DIAGNOSIS — M99.02 SOMATIC DYSFUNCTION OF THORACIC REGION: ICD-10-CM

## 2022-05-20 DIAGNOSIS — M43.28 FUSION OF SACRAL REGION OF SPINE: ICD-10-CM

## 2022-05-20 DIAGNOSIS — M99.05 SOMATIC DYSFUNCTION OF PELVIS REGION: ICD-10-CM

## 2022-05-20 DIAGNOSIS — M51.9 ANNULAR TEAR: ICD-10-CM

## 2022-05-20 DIAGNOSIS — M99.04 SOMATIC DYSFUNCTION OF SACROILIAC JOINT: ICD-10-CM

## 2022-05-20 DIAGNOSIS — M99.03 SOMATIC DYSFUNCTION OF LUMBAR REGION: ICD-10-CM

## 2022-05-20 DIAGNOSIS — M99.06 SOMATIC DYSFUNCTION OF LOWER EXTREMITY: ICD-10-CM

## 2022-05-20 DIAGNOSIS — M79.18 MYOFASCIAL PAIN: ICD-10-CM

## 2022-05-20 PROCEDURE — 99213 OFFICE O/P EST LOW 20 MIN: CPT | Mod: 25 | Performed by: PHYSICAL MEDICINE & REHABILITATION

## 2022-05-20 PROCEDURE — 98927 OSTEOPATH MANJ 5-6 REGIONS: CPT | Performed by: PHYSICAL MEDICINE & REHABILITATION

## 2022-05-20 ASSESSMENT — PAIN SCALES - GENERAL: PAINLEVEL: MODERATE PAIN (4)

## 2022-05-20 NOTE — PATIENT INSTRUCTIONS
Osteopathic manual medicine today    2. Trial chiropractic    3 Trial returning to work without restrictions

## 2022-05-20 NOTE — LETTER
5/20/2022         RE: Cristofer Del Cid  [address] [facility] [address]        Dear Colleague,    Thank you for referring your patient, Cristofer Del Cid, to the [facility]UROSURGERY. Please see a copy of my visit note below.    Assessment/Plan:      Cristofer was seen today for back pain.    Diagnoses and all orders for this visit:    Lumbar spine pain  -     Chiropractic Referral; Future    Fusion of sacral region of spine  -     Chiropractic Referral; Future    Myofascial pain  -     Chiropractic Referral; Future    Somatic dysfunction of thoracic region  -     OSTEOPATHIC MANIP,5-6 BODY REGN    Somatic dysfunction of lumbar region  -     OSTEOPATHIC MANIP,5-6 BODY REGN    Somatic dysfunction of sacroiliac joint  -     OSTEOPATHIC MANIP,5-6 BODY REGN    Somatic dysfunction of pelvis region  -     OSTEOPATHIC MANIP,5-6 BODY REGN    Somatic dysfunction of lower extremity  -     OSTEOPATHIC MANIP,5-6 BODY REGN    Annular tear         Assessment: Pleasant 47 year old male with a history of gastroesophageal reflux controlled by diet and status post right sacroiliac joint fusion with:     1. Lumbar spine pain after lifting a box of books at work on or around November 2 2021.  He has a Schmorl's node in the superior endplate of L5 with mild edema as well as a disc bulge and annular tear at L5-S1 both of which could be causing his severe subacute pain.  He also has significant left groin pain now which is described as a stiffness in the groin to the lateral hip but does radiate to the lumbar spine.  These findings are superimposed on degenerative disc disease throughout the lumbar spine.    He has hip osteoarthritis left greater than right likely contributing.  He has had dramatic improvement from his initial presentation which he has reported over 80% with physical therapy and using medications.  He takes naproxen and cyclobenzaprine occasionally.  Continues to have  waxing waning of  symptoms depending on his activity levels is working with [employer]/work hardening.  We have been able to increase his work restrictions to 30 pound lift limit for 10-hour shifts.  Had good  improvement overall with work hardening, initial flare following Osteopathic manipulative medicine but after second treatment did very well and was up to 95% improved until prolonged sitting yesterday resulting in significant pain flare.        2.  Left hip pain and anterior thigh pain.  Has some pain consistent with osteoarthritis which is greater on the left hip.  Also some myofascial pain.    Resolved     3. Myofascial pain in the right lumbar spine.        4.   Somatic dysfunctions of the   thoracic spine, lumbar spine, sacrum, pelvis, lower extremities that contribute to the patient's pain complaints.    Discussion:    1.  We discussed the diagnosis and treatment options again today.  He has lumbar spine pain with SI pain and an annular tear at L5-S1 I am not sure what is propagating his prolonged waxing waning symptoms.  Has had overall improvement with extensive physical therapy Osteopathic manipulative medicine.  He was doing very well until recent flare with prolonged sitting.  We discussed options of further OMT today.  He also expresses interest in a chiropractor and has a friend that has seen a chiropractor through work.  I did offer injections versus further imaging or surgical evaluation and he declines those options today.    2.  I will provide a referral for chiropractic manipulation.  I do not have a specific chiropractor that I recommend and he will find 1 through some contacts at home and this will be referred to be cleared by work comp.    3.  He was up to 95% improved until yesterday with a pain flare with prolonged sitting.  He would like to try to return to work without restrictions which he feels that he can do as it would be less sitting in 1 place and I will allow him to return to work without restrictions  for 1 month trial.  He did miss today at work due to pain flare.    4.  I do recommend Osteopathic manipulative medicine again today as he is having a flare and was improved.  For several days.    5.  Follow-up with me in 1 month      It was our pleasure caring for your patient today, if there any questions or concerns please do not hesitate to contact us.      Subjective:   Patient ID: Cristofer Del Cid is a 47 year old male.    History of Present Illness: *Patient presents for follow-up of low back pain.  This is after Osteopathic manipulative medicine.  He had been doing very well after Osteopathic manipulative medicine was back working and felt at least 95% improved and felt that he could go back without restrictions.  And then he was doing a lot of training and prolonged sitting without getting up and changing positions every few hours and had significant increased pain yesterday.  Getting up and moving seems to help his pain.  Pain is a 9/10 at worst 4/10 today 1/10 at best.  He missed work today due to significant flare of the pain which at the lumbosacral junction right iliac crest.  Feels a tightness in his back.  Does take Flexeril which is helpful.  He would like to go see a chiropractor wondering if this is and okay option for him.  He has completed standard physical therapy along with work hardening.  OMT again was very beneficial at last visit.      Imaging: I personally viewed images of the lumbar spine such as MRI of the pelvis and plain films of the pelvis.  This shows prior right SI fusion of the pelvis with bony bridging and mild hip osteoarthritis bilaterally.  MRI of the pelvis shows degenerative changes of both hips with thinning of the articular cartilage.  MRI of the lumbar spine reveals degenerative disc disease L4-5 and milder at L5-S1 with small central bulge at L5-S1 and annular tear.  No significant foraminal stenosis    Review of Systems: Pertinent positives: None.  Pertinent negatives: No  numbness, tingling or weakness.  No bowel or bladder incontinence.  No urinary retention.  No fevers, unintentional weight loss, balance changes, headaches, frequent falling, difficulty swallowing, or coordination difficulties.  All others reviewed are negative.           Past Medical History:   Diagnosis Date     GERD (gastroesophageal reflux disease)        The following portions of the patient's history were reviewed and updated as appropriate: allergies, current medications, past family history, past medical history, past social history, past surgical history and problem list.           Objective:   Physical Exam:    /72   Pulse 79   There is no height or weight on file to calculate BMI.      General: Alert and oriented with normal affect. Attention, knowledge, memory, and language are intact. No acute distress.   Eyes: Sclerae are clear.  Respirations: Unlabored. CV: No lower extremity edema.      Gait:  Nonantalgic  Hypertonic tissue textures lumbar paraspinals bilateral lower lumbar spine.  Sensation is intact to light touch throughout the  lower extremities.       Manual muscle testing reveals:  Right /Left out of 5     5/5 hip flexors  5/5 knee extensors  5/5 ankle plantar flexors  5/5 ankle dorsiflexors  5/5  EHL      Structural exam:  Thoracic spine:  T12 rotated left sidebent left. Lumbar spine: L5 rotated right sidebent left. Pelvis: Right innominate upslip, right innominate in flare, anterior inferior right innominate. Sacrum: Left unilateral sacral shear lower extremity: Hypertonic hip flexors and quadriceps bilaterally      Procedure:    After discussing the risks and benefits of osteopathic manipulative medicine, verbal consent was obtained. The somatic dysfunctions listed above were treated with the following techniques:  Thoracic spine: Myofascial release .  Lumbar spine: Myofascial release technique. Pelvis: Still technique muscle energy, and Isometrics. Sacrum: Myofascial release.  Lower  extremities: Muscle energy.    The patient tolerated the procedure well and had improved range of motion in all areas treated prior to leaving the clinic.        Again, thank you for allowing me to participate in the care of your patient.        Sincerely,        Roberto Mcmillan, DO

## 2022-05-20 NOTE — LETTER
May 20, 2022      Cristofer J Maria Eugenia  [address] [facility] [id number]        To Whom It May Concern:    Cristofer RAMON Pavancristianoswald was seen in our clinic on 5/20/2022 and was out of work for medical reasons. He may return to work without restrictions starting 5/21/2022.      Sincerely,        Roberto Mcmillan, DO

## 2022-05-20 NOTE — PROGRESS NOTES
Assessment/Plan:      Cristofer was seen today for back pain.    Diagnoses and all orders for this visit:    Lumbar spine pain  -     Chiropractic Referral; Future    Fusion of sacral region of spine  -     Chiropractic Referral; Future    Myofascial pain  -     Chiropractic Referral; Future    Somatic dysfunction of thoracic region  -     OSTEOPATHIC MANIP,5-6 BODY REGN    Somatic dysfunction of lumbar region  -     OSTEOPATHIC MANIP,5-6 BODY REGN    Somatic dysfunction of sacroiliac joint  -     OSTEOPATHIC MANIP,5-6 BODY REGN    Somatic dysfunction of pelvis region  -     OSTEOPATHIC MANIP,5-6 BODY REGN    Somatic dysfunction of lower extremity  -     OSTEOPATHIC MANIP,5-6 BODY REGN    Annular tear         Assessment: Pleasant 47 year old male with a history of gastroesophageal reflux controlled by diet and status post right sacroiliac joint fusion with:     1. Lumbar spine pain after lifting a box of books at work on or around November 2 2021.  He has a Schmorl's node in the superior endplate of L5 with mild edema as well as a disc bulge and annular tear at L5-S1 both of which could be causing his severe subacute pain.  He also has significant left groin pain now which is described as a stiffness in the groin to the lateral hip but does radiate to the lumbar spine.  These findings are superimposed on degenerative disc disease throughout the lumbar spine.    He has hip osteoarthritis left greater than right likely contributing.  He has had dramatic improvement from his initial presentation which he has reported over 80% with physical therapy and using medications.  He takes naproxen and cyclobenzaprine occasionally.  Continues to have  waxing waning of symptoms depending on his activity levels is working with [employer]/work hardening.  We have been able to increase his work restrictions to 30 pound lift limit for 10-hour shifts.  Had good  improvement overall with work hardening, initial flare following Osteopathic  manipulative medicine but after second treatment did very well and was up to 95% improved until prolonged sitting yesterday resulting in significant pain flare.        2.  Left hip pain and anterior thigh pain.  Has some pain consistent with osteoarthritis which is greater on the left hip.  Also some myofascial pain.    Resolved     3. Myofascial pain in the right lumbar spine.        4.   Somatic dysfunctions of the   thoracic spine, lumbar spine, sacrum, pelvis, lower extremities that contribute to the patient's pain complaints.    Discussion:    1.  We discussed the diagnosis and treatment options again today.  He has lumbar spine pain with SI pain and an annular tear at L5-S1 I am not sure what is propagating his prolonged waxing waning symptoms.  Has had overall improvement with extensive physical therapy Osteopathic manipulative medicine.  He was doing very well until recent flare with prolonged sitting.  We discussed options of further OMT today.  He also expresses interest in a chiropractor and has a friend that has seen a chiropractor through work.  I did offer injections versus further imaging or surgical evaluation and he declines those options today.    2.  I will provide a referral for chiropractic manipulation.  I do not have a specific chiropractor that I recommend and he will find 1 through some contacts at home and this will be referred to be cleared by work comp.    3.  He was up to 95% improved until yesterday with a pain flare with prolonged sitting.  He would like to try to return to work without restrictions which he feels that he can do as it would be less sitting in 1 place and I will allow him to return to work without restrictions for 1 month trial.  He did miss today at work due to pain flare.    4.  I do recommend Osteopathic manipulative medicine again today as he is having a flare and was improved.  For several days.    5.  Follow-up with me in 1 month      It was our pleasure caring  for your patient today, if there any questions or concerns please do not hesitate to contact us.      Subjective:   Patient ID: Cristofer Del Cid is a 47 year old male.    History of Present Illness: *Patient presents for follow-up of low back pain.  This is after Osteopathic manipulative medicine.  He had been doing very well after Osteopathic manipulative medicine was back working and felt at least 95% improved and felt that he could go back without restrictions.  And then he was doing a lot of training and prolonged sitting without getting up and changing positions every few hours and had significant increased pain yesterday.  Getting up and moving seems to help his pain.  Pain is a 9/10 at worst 4/10 today 1/10 at best.  He missed work today due to significant flare of the pain which at the lumbosacral junction right iliac crest.  Feels a tightness in his back.  Does take Flexeril which is helpful.  He would like to go see a chiropractor wondering if this is and okay option for him.  He has completed standard physical therapy along with work hardening.  OMT again was very beneficial at last visit.      Imaging: I personally viewed images of the lumbar spine such as MRI of the pelvis and plain films of the pelvis.  This shows prior right SI fusion of the pelvis with bony bridging and mild hip osteoarthritis bilaterally.  MRI of the pelvis shows degenerative changes of both hips with thinning of the articular cartilage.  MRI of the lumbar spine reveals degenerative disc disease L4-5 and milder at L5-S1 with small central bulge at L5-S1 and annular tear.  No significant foraminal stenosis    Review of Systems: Pertinent positives: None.  Pertinent negatives: No numbness, tingling or weakness.  No bowel or bladder incontinence.  No urinary retention.  No fevers, unintentional weight loss, balance changes, headaches, frequent falling, difficulty swallowing, or coordination difficulties.  All others reviewed are  negative.           Past Medical History:   Diagnosis Date     GERD (gastroesophageal reflux disease)        The following portions of the patient's history were reviewed and updated as appropriate: allergies, current medications, past family history, past medical history, past social history, past surgical history and problem list.           Objective:   Physical Exam:    /72   Pulse 79   There is no height or weight on file to calculate BMI.      General: Alert and oriented with normal affect. Attention, knowledge, memory, and language are intact. No acute distress.   Eyes: Sclerae are clear.  Respirations: Unlabored. CV: No lower extremity edema.      Gait:  Nonantalgic  Hypertonic tissue textures lumbar paraspinals bilateral lower lumbar spine.  Sensation is intact to light touch throughout the  lower extremities.       Manual muscle testing reveals:  Right /Left out of 5     5/5 hip flexors  5/5 knee extensors  5/5 ankle plantar flexors  5/5 ankle dorsiflexors  5/5  EHL      Structural exam:  Thoracic spine:  T12 rotated left sidebent left. Lumbar spine: L5 rotated right sidebent left. Pelvis: Right innominate upslip, right innominate in flare, anterior inferior right innominate. Sacrum: Left unilateral sacral shear lower extremity: Hypertonic hip flexors and quadriceps bilaterally      Procedure:    After discussing the risks and benefits of osteopathic manipulative medicine, verbal consent was obtained. The somatic dysfunctions listed above were treated with the following techniques:  Thoracic spine: Myofascial release .  Lumbar spine: Myofascial release technique. Pelvis: Still technique muscle energy, and Isometrics. Sacrum: Myofascial release.  Lower extremities: Muscle energy.    The patient tolerated the procedure well and had improved range of motion in all areas treated prior to leaving the clinic.

## 2022-11-04 NOTE — ADDENDUM NOTE
Encounter addended by: Maribell Choudhary, PT on: 11/4/2022 8:29 AM   Actions taken: Clinical Note Signed, Episode resolved

## 2022-11-04 NOTE — PROGRESS NOTES
[facility] Rehabilitation Service    Outpatient Physical Therapy Discharge Note  Patient: Cristofer Del Cid  : 1975    Beginning/End Dates of Reporting Period:  21 to 3/17/22    Referring Provider: Dr. Roberto corrigan    Therapy Diagnosis: Lumbar spine pain     Client Self Report: Now returned to 8 hours and tolerating okay. Sore from not sleeping mateo to daylight savings    Objective Measurements:                    Objective Measure: pain level  Details: 2       Goals:  Goal Identifier Work Tasks   Goal Description Pt will report ability to tolerate a 10-hour work shift, including all tasks necessary (standing, walking, sitting, running, etc.) without LBP.    Target Date 22   Date Met      Progress (detail required for progress note): close to meeting is currently working 8 hours     Goal Identifier Lifting   Goal Description Pt will demonstrate ability to lift (with correct body mechanics) 25-50 lbs from the floor to waist and waist to waist level to demonstrate ability to perform normal work duties without back pain.    Target Date 22   Date Met      Progress (detail required for progress note): not assessed this visit     Goal Identifier Sitting   Goal Description Pt will report ability to tolerate sitting 2 hours to be able to tolerate a shift of light duty work.    Target Date 22   Date Met      Progress (detail required for progress note): Met     Goal Identifier     Goal Description     Target Date     Date Met      Progress (detail required for progress note):       Goal Identifier     Goal Description     Target Date     Date Met      Progress (detail required for progress note):       Goal Identifier     Goal Description     Target Date     Date Met      Progress (detail required for progress note):       Goal Identifier     Goal Description     Target Date     Date Met      Progress (detail  required for progress note):       Goal Identifier     Goal Description     Target Date     Date Met      Progress (detail required for progress note):             Plan:  Discharge from therapy.    Discharge: outpatient PT to [facility]    Reason for Discharge: Patient has met all goals.        Discharge Plan: Patient to continue home program.

## 2023-04-16 ENCOUNTER — HEALTH MAINTENANCE LETTER (OUTPATIENT)
Age: 48
End: 2023-04-16

## 2023-05-18 ENCOUNTER — OFFICE VISIT (OUTPATIENT)
Dept: PHYSICAL MEDICINE AND REHAB | Facility: CLINIC | Age: 48
End: 2023-05-18
Payer: OTHER MISCELLANEOUS

## 2023-05-18 VITALS — SYSTOLIC BLOOD PRESSURE: 120 MMHG | HEART RATE: 82 BPM | DIASTOLIC BLOOD PRESSURE: 72 MMHG

## 2023-05-18 DIAGNOSIS — M43.28 FUSION OF SACRAL REGION OF SPINE: ICD-10-CM

## 2023-05-18 DIAGNOSIS — M54.50 LUMBAR SPINE PAIN: Primary | ICD-10-CM

## 2023-05-18 DIAGNOSIS — M47.816 LUMBAR FACET ARTHROPATHY: ICD-10-CM

## 2023-05-18 DIAGNOSIS — M79.18 MYOFASCIAL PAIN: ICD-10-CM

## 2023-05-18 PROCEDURE — 99213 OFFICE O/P EST LOW 20 MIN: CPT | Performed by: PHYSICAL MEDICINE & REHABILITATION

## 2023-05-18 ASSESSMENT — PAIN SCALES - GENERAL: PAINLEVEL: MILD PAIN (3)

## 2023-05-18 NOTE — PROGRESS NOTES
Assessment/Plan:      Cristofer was seen today for back pain.    Diagnoses and all orders for this visit:    Lumbar spine pain    Fusion of sacral region of spine    Lumbar facet arthropathy    Myofascial pain         Assessment: Pleasant 48 year old male  with a history of gastroesophageal reflux controlled by diet and status post right sacroiliac joint fusion with:     1.  Right-sided Lumbar spine pain after lifting a box of books at work on or around November 2 2021.  He has a Schmorl's node in the superior endplate of L5 with mild edema as well as a disc bulge and annular tear at L5-S1 both of which could be causing his severe subacute pain.  Initially had significant left groin pain which has improved.    These findings are superimposed on degenerative disc disease throughout the lumbar spine.  He also has hip osteoarthritis left greater than right likely contributing to his mechanical pain.  He has had dramatic improvement from his initial presentation which he has reported significant relief with physical therapy, Med[employer] therapy and work hardening, and initially medications which he now has no longer been taking, Osteopathic manipulative medicine and subsequent chiropractic which she has been receiving every other week for the past year.  Flare of his pain since stopping chiropractic but remains better since his initial presentation.  His recent flare is likely related to mechanical pain and myofascial pain.  Has been working without restrictions.     2.  Left hip pain and anterior thigh pain.  Has some pain consistent with osteoarthritis which is greater on the left hip.  Also some myofascial pain.    Resolved     3.  Right lower lumbar spine myofascial pain       Discussion:    1.  Overall he is doing very well.  We discussed that he had a flare over the past 6 to 8 weeks after stopping chiropractic.  We discussed options of continuing home exercises and stretches along with adding medications further imaging or  manipulation.  He basically wants to monitor his symptoms and continue with home exercises.    2.  I recommend he continue with foam rolling and home stretches and monitor symptoms.    3.  Continue ibuprofen as needed    4.  Continue to work without restrictions.    5.  If he has persistent pain can consider further imaging to evaluate the right SI fusion such as CT of the lumbar spine and pelvis.    6.  Follow-up with me as needed.      It was our pleasure caring for your patient today, if there any questions or concerns please do not hesitate to contact us.    25 minutes were spent on the date of the encounter performing chart review, patient visit and documentation in addition to any procedure.      Subjective:   Patient ID: Cristofer Del Cid is a 48 year old male.    History of Present Illness: *Patient presents for evaluation of his right-sided low back gluteal pain lower extremity pain and paresthesias to the calf.  Symptoms l had been improved since last visit which was 1 year ago May 20, 2022.  At that visit had Osteopathic manipulative medicine has been working without restrictions since then and was seen by chiropractor every other week for the past year.  States that [employer]er[employer] Compensation stopped paying for that about 2 months ago and over the past 6 to 8 weeks he developed increased pain and stiffness to the right low back right gluteal region down the back of the leg to the calf.  Pain is worse with prolonged sitting or standing as well as bending the knee.  Stretching and foam roller typically helps.  Has taken ibuprofen which has been very helpful.  Pain is a 5/10 at worst 3/10 today 0/10 at best.  Has been able to get by without medications and has been working without restrictions.  Overall still feels better feels that he can manage this on his own but wanted to check in given the recent flare.      Imaging: MRI lumbar spine November 2021 images personally reviewed for medical decision-making purposes.   Mild disc height loss L2-3 disc desiccation L3-4 through L5-S1/T2 signal loss.  Hemangioma at the L3 vertebral body.  No central canal stenosis.  Prior right SI fusion.  No disc herniations.  Right-sided facet arthropathy L5-S1 mild to moderate greater than left.    Review of Systems: Pertinent positives: Paresthesias weakness right leg.  Pertinent negatives:   No bowel or bladder incontinence.  No urinary retention.  No fevers, unintentional weight loss, balance changes, headaches, frequent falling, difficulty swallowing, or coordination difficulties.  All others reviewed are negative.       Past Medical History:   Diagnosis Date     GERD (gastroesophageal reflux disease)        The following portions of the patient's history were reviewed and updated as appropriate: allergies, current medications, past family history, past medical history, past social history, past surgical history and problem list.           Objective:   Physical Exam:    /72   Pulse 82   There is no height or weight on file to calculate BMI.      General: Alert and oriented with normal affect. Attention, knowledge, memory, and language are intact. No acute distress.     CV: No lower extremity edema.    Gait:  Nonantalgic  Tenderness right lumbar paraspinals as well as right sciatic notch.  Mildly reduced lumbar flexion finger to floor testing.  Sensation is intact to light touch throughout the   lower extremities.  Reflexes are  1+ patellar and 0  Achilles    Manual muscle testing reveals:  Right /Left out of 5     5/5 hip flexors  5/5 knee flexors  5/5 knee extensors  5/5 ankle plantar flexors  5/5 ankle dorsiflexors  5/5  EHL

## 2023-05-18 NOTE — LETTER
5/18/2023         RE: Cristofer Del Cid  [address] [address]        Dear Colleague,    Thank you for referring your patient, Cristofer Del Cid, to the [facility]. Please see a copy of my visit note below.    Assessment/Plan:      Cristofer was seen today for back pain.    Diagnoses and all orders for this visit:    Lumbar spine pain    Fusion of sacral region of spine    Lumbar facet arthropathy    Myofascial pain         Assessment: Pleasant 48 year old male  with a history of gastroesophageal reflux controlled by diet and status post right sacroiliac joint fusion with:     1.  Right-sided Lumbar spine pain after lifting a box of books at work on or around November 2 2021.  He has a Schmorl's node in the superior endplate of L5 with mild edema as well as a disc bulge and annular tear at L5-S1 both of which could be causing his severe subacute pain.  Initially had significant left groin pain which has improved.    These findings are superimposed on degenerative disc disease throughout the lumbar spine.  He also has hip osteoarthritis left greater than right likely contributing to his mechanical pain.  He has had dramatic improvement from his initial presentation which he has reported significant relief with physical therapy, MedX therapy and work hardening, and initially medications which he now has no longer been taking, Osteopathic manipulative medicine and subsequent chiropractic which she has been receiving every other week for the past year.  Flare of his pain since stopping chiropractic but remains better since his initial presentation.  His recent flare is likely related to mechanical pain and myofascial pain.  Has been working without restrictions.     2.  Left hip pain and anterior thigh pain.  Has some pain consistent with osteoarthritis which is greater on the left hip.  Also some myofascial pain.    Resolved     3.  Right lower lumbar spine myofascial  pain       Discussion:    1.  Overall he is doing very well.  We discussed that he had a flare over the past 6 to 8 weeks after stopping chiropractic.  We discussed options of continuing home exercises and stretches along with adding medications further imaging or manipulation.  He basically wants to monitor his symptoms and continue with home exercises.    2.  I recommend he continue with foam rolling and home stretches and monitor symptoms.    3.  Continue ibuprofen as needed    4.  Continue to work without restrictions.    5.  If he has persistent pain can consider further imaging to evaluate the right SI fusion such as CT of the lumbar spine and pelvis.    6.  Follow-up with me as needed.      It was our pleasure caring for your patient today, if there any questions or concerns please do not hesitate to contact us.    25 minutes were spent on the date of the encounter performing chart review, patient visit and documentation in addition to any procedure.      Subjective:   Patient ID: Cristofer Del Cid is a 48 year old male.    History of Present Illness: *Patient presents for evaluation of his right-sided low back gluteal pain lower extremity pain and paresthesias to the calf.  Symptoms l had been improved since last visit which was 1 year ago May 20, 2022.  At that visit had Osteopathic manipulative medicine has been working without restrictions since then and was seen by chiropractor every other week for the past year.  States that Worker[employer] Compensation stopped paying for that about 2 months ago and over the past 6 to 8 weeks he developed increased pain and stiffness to the right low back right gluteal region down the back of the leg to the calf.  Pain is worse with prolonged sitting or standing as well as bending the knee.  Stretching and foam roller typically helps.  Has taken ibuprofen which has been very helpful.  Pain is a 5/10 at worst 3/10 today 0/10 at best.  Has been able to get by without medications and  has been working without restrictions.  Overall still feels better feels that he can manage this on his own but wanted to check in given the recent flare.      Imaging: MRI lumbar spine November 2021 images personally reviewed for medical decision-making purposes.  Mild disc height loss L2-3 disc desiccation L3-4 through L5-S1/T2 signal loss.  Hemangioma at the L3 vertebral body.  No central canal stenosis.  Prior right SI fusion.  No disc herniations.  Right-sided facet arthropathy L5-S1 mild to moderate greater than left.    Review of Systems: Pertinent positives: Paresthesias weakness right leg.  Pertinent negatives:   No bowel or bladder incontinence.  No urinary retention.  No fevers, unintentional weight loss, balance changes, headaches, frequent falling, difficulty swallowing, or coordination difficulties.  All others reviewed are negative.       Past Medical History:   Diagnosis Date     GERD (gastroesophageal reflux disease)        The following portions of the patient's history were reviewed and updated as appropriate: allergies, current medications, past family history, past medical history, past social history, past surgical history and problem list.           Objective:   Physical Exam:    /72   Pulse 82   There is no height or weight on file to calculate BMI.      General: Alert and oriented with normal affect. Attention, knowledge, memory, and language are intact. No acute distress.     CV: No lower extremity edema.    Gait:  Nonantalgic  Tenderness right lumbar paraspinals as well as right sciatic notch.  Mildly reduced lumbar flexion finger to floor testing.  Sensation is intact to light touch throughout the   lower extremities.  Reflexes are  1+ patellar and 0  Achilles    Manual muscle testing reveals:  Right /Left out of 5     5/5 hip flexors  5/5 knee flexors  5/5 knee extensors  5/5 ankle plantar flexors  5/5 ankle dorsiflexors  5/5  EHL       Again, thank you for allowing me to  participate in the care of your patient.        Sincerely,        Roberto Mcmillan, DO

## 2023-05-18 NOTE — PATIENT INSTRUCTIONS
Continue with Ibuprofen as needed  Continue with your home exercises and foam roller  Continue to work without restrictions

## 2023-06-22 NOTE — PROGRESS NOTES
"Date: 10/22/2020 19:05:34  Clinician: Jose Haddad  Clinician NPI: [id number]  Patient: keesha ochoa  Patient : 1975  Patient Address: [address], [place] [address]  Patient Phone: [phone]  Visit Protocol: URI  Patient Summary:  keesha is a 45 year old ( : 1975 ) male who initiated a [facility] Visit for COVID-19 (Coronavirus) evaluation and screening. When asked the question \"Please sign me up to receive news, health information and promotions. \", keesha responded \"No\".    keesha states his symptoms started 1-2 days ago.   His symptoms consist of nausea, myalgia, chills, malaise, and diarrhea.   keesha denies having ear pain, headache, wheezing, fever, cough, nasal congestion, anosmia, vomiting, rhinitis, facial pain or pressure, sore throat, teeth pain, and ageusia. He also denies taking antibiotic medication in the past month and having recent facial or sinus surgery in the past 60 days. He is not experiencing dyspnea.   Precipitating events  He has recently been exposed to someone with influenza. keesha has been in close contact with the following high risk individuals: people with asthma, heart disease or diabetes.   Pertinent COVID-19 (Coronavirus) information  In the past 14 days, keesha has not worked in a congregate living setting.   He does not work or volunteer as healthcare worker or a  and does not work or volunteer in a healthcare facility.   keesha also has not lived in a congregate living setting in the past 14 days. He does not live with a healthcare worker.   keesha has had a close contact with a laboratory-confirmed COVID-19 patient within 14 days of symptom onset. Additional information about contact with COVID-19 (Coronavirus) patient as reported by the patient (free text): Someone was talking to me that wife test positive now is getting tested.   Since 2019, keesha and has not had upper respiratory infection or influenza-like illness. Has not " been diagnosed with lab-confirmed COVID-19 test   Pertinent medical history  keesha does not need a return to work/school note.   Weight: 245 lbs   keesha does not smoke or use smokeless tobacco.   Weight: 245 lbs    MEDICATIONS: No current medications, ALLERGIES: NKDA  Clinician Response:  Dear keesha,   Your symptoms show that you may have coronavirus (COVID-19). This illness can cause fever, cough and trouble breathing. Many people get a mild case and get better on their own. Some people can get very sick.  What should I do?  We would like to test you for this virus.   1. Please call [phone] to schedule your visit. Explain that you were referred by [facility] to have a COVID-19 test. Be ready to [facility].  The following will serve as your written order for this COVID Test, ordered by me, for the indication of suspected COVID [Z20.828]: The test will be ordered in [facility], our electronic health record, after you are scheduled. It will show as ordered and authorized by Chan Glover MD.  Order: COVID-19 (Coronavirus) PCR for SYMPTOMATIC testing from [facility].      2. When it's time for your COVID test:  Stay at least 6 feet away from others. (If someone will drive you to your test, stay in the backseat, as far away from the  as you can.)   Cover your mouth and nose with a mask, tissue or washcloth.  Go straight to the testing site. Don't make any stops on the way there or back.      3.Starting now: Stay home and away from others (self-isolate) until:   You've had no fever---and no medicine that reduces fever---for one full day (24 hours). And...   Your other symptoms have gotten better. For example, your cough or breathing has improved. And...   At least 10 days have passed since your symptoms started.       During this time, don't leave the house except for testing or medical care.   Stay in your own room, even for meals. Use your own bathroom if you can.   Stay away from others in your home.  "No hugging, kissing or shaking hands. No visitors.  Don't go to work, school or anywhere else.    Clean \"high touch\" surfaces often (doorknobs, counters, handles, etc.). Use a household cleaning spray or wipes. You'll find a full list of  on the EPA website: www.epa.gov/pesticide-registration/list-n-disinfectants-use-against-sars-cov-2.   Cover your mouth and nose with a mask, tissue or washcloth to avoid spreading germs.  Wash your hands and face often. Use soap and water.  Caregivers in these groups are at risk for severe illness due to COVID-19:  o People 65 years and older  o People who live in a nursing home or long-term care facility  o People with chronic disease (lung, heart, cancer, diabetes, kidney, liver, immunologic)  o People who have a weakened immune system, including those who:   Are in cancer treatment  Take medicine that weakens the immune system, such as corticosteroids  Had a bone marrow or organ transplant  Have an immune deficiency  Have poorly controlled HIV or AIDS  Are obese (body mass index of 40 or higher)  Smoke regularly   o Caregivers should wear gloves while washing dishes, handling laundry and cleaning bedrooms and bathrooms.  o Use caution when washing and drying laundry: Don't shake dirty laundry, and use the warmest water setting that you can.  o For more tips, go to www.cdc.gov/coronavirus/2019-ncov/downloads/10Things.pdf.    4.Sign up for [employer]. We know it's scary to hear that you might have COVID-19. We want to track your symptoms to make sure you're okay over the next 2 weeks. Please look for an email from [employer]---this is a free, online program that we'll use to keep in touch. To sign up, follow the link in the email. Learn more at [email or web address]  How can I take care of myself?   Get lots of rest. Drink extra fluids (unless a doctor has told you not to).   Take Tylenol (acetaminophen) for fever or pain. If you have liver or kidney " problems, ask your family doctor if it's okay to take Tylenol.   Adults can take either:    650 mg (two 325 mg pills) every 4 to 6 hours, or...   1,000 mg (two 500 mg pills) every 8 hours as needed.    Note: Don't take more than 3,000 mg in one day. Acetaminophen is found in many medicines (both prescribed and over-the-counter medicines). Read all labels to be sure you don't take too much.   For children, check the Tylenol bottle for the right dose. The dose is based on the child's age or weight.    If you have other health problems (like cancer, heart failure, an organ transplant or severe kidney disease): Call your specialty clinic if you don't feel better in the next 2 days.       Know when to call 911. Emergency warning signs include:    Trouble breathing or shortness of breath Pain or pressure in the chest that doesn't go away Feeling confused like you haven't felt before, or not being able to wake up Bluish-colored lips or face.  Where can I get more information?   [facility] -- About COVID-19: [email or web address]   CDC -- What to Do If You're Sick: www.cdc.gov/coronavirus/2019-ncov/about/steps-when-sick.html   CDC -- Ending Home Isolation: www.cdc.gov/coronavirus/2019-ncov/hcp/disposition-in-home-patients.html   [facility] -- Caring for Someone: www.cdc.gov/coronavirus/2019-ncov/if-you-are-sick/care-for-someone.html   [facility] -- Interim Guidance for Hospital Discharge to Home: www.health.[facility].[place]/diseases/coronavirus/hcp/hospdischarge.pdf   [facility] clinical trials (COVID-19 research studies): clinicalaffairs.[employer].[facility]/[employer]-clinical-trials    Below are the COVID-19 hotlines at the [place] Department of Health ([facility]). Interpreters are available.    For health questions: Call 998-257-4555 or 1-985.406.4866 (7 a.m. to 7 p.m.) For questions about schools and childcare: Call 947-223-5801 or 1-935.377.4681 (7 a.m. to 7 p.m.)    Diagnosis: Contact with and (suspected) exposure to other viral  communicable diseases  Diagnosis ICD: Z20.828  Additional Clinician Notes:   If your symptoms are not improving or worsen, please go to one of our urgent care locations for evaluation and possible lab work.    No

## 2024-06-22 ENCOUNTER — HEALTH MAINTENANCE LETTER (OUTPATIENT)
Age: 49
End: 2024-06-22

## 2024-08-01 NOTE — PROGRESS NOTES
ASSESSMENT:  1. Acute maxillary sinusitis, recurrence not specified  We will start a 10-day course of Augmentin and discussed symptomatic treatment including Tylenol or ibuprofen, nasal saline rinses, and Afrin nasal spray.  Follow-up with any further questions or concerns.  - amoxicillin-clavulanate (AUGMENTIN) 875-125 mg per tablet; Take 1 tablet by mouth 2 (two) times a day for 10 days.  Dispense: 20 tablet; Refill: 0        PLAN:  There are no Patient Instructions on file for this visit.    No orders of the defined types were placed in this encounter.    There are no discontinued medications.    No follow-ups on file.    CHIEF COMPLAINT:  Chief Complaint   Patient presents with     Sinus Problem     Left side of head x 2-3 weeks.     Ear Problem     Left side      Headache       HISTORY OF PRESENT ILLNESS:  Cristofer is a 45 y.o. male presenting to the clinic today for complaints of L ear pain. He has a horse voice, sore throat, cough, drainage down his throat, clogged sinuses, and a severe headache as well. These symptoms have been persisting over the last two weeks. He denies a history of frequent sinus infections, frequent otitis media, or asthma.     REVIEW OF SYSTEMS:   All other systems are negative.    PFSH:  History below reviewed. No new significant history.     TOBACCO USE:  Social History     Tobacco Use   Smoking Status Former Smoker     Packs/day: 0.00   Smokeless Tobacco Former User   Tobacco Comment    quit 10 yrs ago       VITALS:  Vitals:    01/30/20 1101   BP: 102/62   Patient Site: Left Arm   Patient Position: Sitting   Cuff Size: Adult Large   Pulse: 94   Temp: 98.1  F (36.7  C)   TempSrc: Oral   SpO2: 97%   Weight: (!) 256 lb 9.6 oz (116.4 kg)     Wt Readings from Last 3 Encounters:   01/30/20 (!) 256 lb 9.6 oz (116.4 kg)   10/30/19 (!) 251 lb 9 oz (114.1 kg)   08/01/19 (!) 245 lb (111.1 kg)     Body mass index is 35.79 kg/m .    PHYSICAL EXAM:    General Appearance:  Alert, cooperative, no  distress, appears stated age   HEENT:  Ears: L canal small amount of blood, but more skin related.  Nose: Bilateral congestion.        Lungs:   Clear to auscultation bilaterally, respirations unlabored.  No expiratory wheeze or inspiratory crackles noted.   Heart:  Regular rate and rhythm, S1, S2 normal, no murmur, rub or gallop     ADDITIONAL HISTORY SUMMARIZED (2): None.  DECISION TO OBTAIN EXTRA INFORMATION (1): None.   RADIOLOGY TESTS (1): None.  LABS (1): None.  MEDICINE TESTS (1): None.  INDEPENDENT REVIEW (2 each): None.     The visit lasted a total of 5 minutes face to face with the patient. Over 50% of the time was spent counseling and educating the patient about sinus infection treatment.    Judy JAMES, am scribing for and in the presence of, Dr. Ge.    IDr. Ge, personally performed the services described in this documentation, as scribed by Judy Mayo in my presence, and it is both accurate and complete.    MEDICATIONS:  Current Outpatient Medications   Medication Sig Dispense Refill     amoxicillin-clavulanate (AUGMENTIN) 875-125 mg per tablet Take 1 tablet by mouth 2 (two) times a day for 10 days. 20 tablet 0     No current facility-administered medications for this visit.        Total data points: 0          [Alert] : alert [Oriented x 3] : ~L oriented x 3 [Well Nourished] : well nourished [FreeTextEntry3] : The following areas were examined and no significant abnormalities were seen except as noted below:  Type II skin  scalp, face, eyelids, nose, lips, ears, neck, chest, abdomen, back,groin, buttocks, right arm, left arm, right hand, left hand, right  leg, left leg, right foot, left foot  Patient is tan  Scalp: Multiple pink scaly macules -extends to the upper forehead Right anterior scalp: 8 x 4 mm thin tan slightly verrucous plaque Mid forehead on left: 5 x 3 mm brown slightly verrucous papule-not suspicious on dermoscopy Face: Mild pink scaly macules-especially on bridge of nose Upper abdomen on left: Single brown verrucous plaque  Mild to moderate erythematous scaly plaques present on the elbows and knees-most prominent on the left knee Also present in the intergluteal cleft  No other suspicious lesions seen

## 2025-07-12 ENCOUNTER — HEALTH MAINTENANCE LETTER (OUTPATIENT)
Age: 50
End: 2025-07-12